# Patient Record
Sex: FEMALE | Race: BLACK OR AFRICAN AMERICAN | NOT HISPANIC OR LATINO | Employment: FULL TIME | URBAN - METROPOLITAN AREA
[De-identification: names, ages, dates, MRNs, and addresses within clinical notes are randomized per-mention and may not be internally consistent; named-entity substitution may affect disease eponyms.]

---

## 2017-10-23 ENCOUNTER — TELEPHONE (OUTPATIENT)
Dept: INTERNAL MEDICINE | Facility: CLINIC | Age: 47
End: 2017-10-23

## 2017-10-23 RX ORDER — OSELTAMIVIR PHOSPHATE 75 MG/1
75 CAPSULE ORAL 2 TIMES DAILY
Qty: 10 CAPSULE | Refills: 0 | Status: SHIPPED | OUTPATIENT
Start: 2017-10-23 | End: 2017-10-28

## 2020-03-13 RX ORDER — AZITHROMYCIN 500 MG/1
500 TABLET, FILM COATED ORAL DAILY
Qty: 7 TABLET | Refills: 0 | Status: SHIPPED | OUTPATIENT
Start: 2020-03-13 | End: 2022-06-27

## 2020-08-03 ENCOUNTER — HOSPITAL ENCOUNTER (OUTPATIENT)
Dept: RADIOLOGY | Facility: HOSPITAL | Age: 50
Discharge: HOME | End: 2020-08-03
Attending: INTERNAL MEDICINE
Payer: COMMERCIAL

## 2020-08-03 ENCOUNTER — TELEPHONE (OUTPATIENT)
Dept: INTERNAL MEDICINE | Facility: CLINIC | Age: 50
End: 2020-08-03

## 2020-08-03 DIAGNOSIS — Z00.00 ROUTINE GENERAL MEDICAL EXAMINATION AT A HEALTH CARE FACILITY: Primary | ICD-10-CM

## 2020-08-03 DIAGNOSIS — R60.9 EDEMA, UNSPECIFIED TYPE: ICD-10-CM

## 2020-08-03 PROCEDURE — 93970 EXTREMITY STUDY: CPT | Mod: TC

## 2020-08-03 PROCEDURE — 93970 US LOWER EXTREMITY VEINS BILATERAL: ICD-10-PCS | Mod: 26,,, | Performed by: RADIOLOGY

## 2020-08-03 PROCEDURE — 93970 EXTREMITY STUDY: CPT | Mod: 26,,, | Performed by: RADIOLOGY

## 2020-08-03 NOTE — TELEPHONE ENCOUNTER
PLease notify Claudette  Needs venous ultrasound of legs - booked for 6:30 pm at the imaging center  Fasting labs for the morning.   I can work her in - - have her come for 7:30 am .

## 2020-08-03 NOTE — TELEPHONE ENCOUNTER
Pt states that she is out of town, she won't be back until tomorrow. She would like u/s scheduled for then.

## 2020-08-04 ENCOUNTER — HOSPITAL ENCOUNTER (OUTPATIENT)
Dept: RADIOLOGY | Facility: HOSPITAL | Age: 50
Discharge: HOME | End: 2020-08-04
Attending: INTERNAL MEDICINE
Payer: COMMERCIAL

## 2020-08-04 ENCOUNTER — OFFICE VISIT (OUTPATIENT)
Dept: INTERNAL MEDICINE | Facility: CLINIC | Age: 50
End: 2020-08-04
Payer: COMMERCIAL

## 2020-08-04 VITALS
BODY MASS INDEX: 39.63 KG/M2 | SYSTOLIC BLOOD PRESSURE: 130 MMHG | HEIGHT: 61 IN | HEART RATE: 83 BPM | WEIGHT: 209.88 LBS | DIASTOLIC BLOOD PRESSURE: 80 MMHG | OXYGEN SATURATION: 99 %

## 2020-08-04 DIAGNOSIS — Z01.419 ENCOUNTER FOR GYNECOLOGICAL EXAMINATION WITHOUT ABNORMAL FINDING: ICD-10-CM

## 2020-08-04 DIAGNOSIS — Z12.31 SCREENING MAMMOGRAM, ENCOUNTER FOR: ICD-10-CM

## 2020-08-04 DIAGNOSIS — Z00.00 ROUTINE GENERAL MEDICAL EXAMINATION AT A HEALTH CARE FACILITY: Primary | ICD-10-CM

## 2020-08-04 DIAGNOSIS — Z12.11 SCREENING FOR COLON CANCER: ICD-10-CM

## 2020-08-04 PROCEDURE — 77063 MAMMO DIGITAL SCREENING BILAT WITH TOMOSYNTHESIS_CAD: ICD-10-PCS | Mod: 26,,, | Performed by: RADIOLOGY

## 2020-08-04 PROCEDURE — 99386 PREV VISIT NEW AGE 40-64: CPT | Mod: S$GLB,,, | Performed by: INTERNAL MEDICINE

## 2020-08-04 PROCEDURE — 77067 SCR MAMMO BI INCL CAD: CPT | Mod: 26,,, | Performed by: RADIOLOGY

## 2020-08-04 PROCEDURE — 3008F BODY MASS INDEX DOCD: CPT | Mod: CPTII,S$GLB,, | Performed by: INTERNAL MEDICINE

## 2020-08-04 PROCEDURE — 3008F PR BODY MASS INDEX (BMI) DOCUMENTED: ICD-10-PCS | Mod: CPTII,S$GLB,, | Performed by: INTERNAL MEDICINE

## 2020-08-04 PROCEDURE — 99999 PR PBB SHADOW E&M-EST. PATIENT-LVL III: ICD-10-PCS | Mod: PBBFAC,,, | Performed by: INTERNAL MEDICINE

## 2020-08-04 PROCEDURE — 77063 BREAST TOMOSYNTHESIS BI: CPT | Mod: 26,,, | Performed by: RADIOLOGY

## 2020-08-04 PROCEDURE — 77067 MAMMO DIGITAL SCREENING BILAT WITH TOMOSYNTHESIS_CAD: ICD-10-PCS | Mod: 26,,, | Performed by: RADIOLOGY

## 2020-08-04 PROCEDURE — 99386 PR PREVENTIVE VISIT,NEW,40-64: ICD-10-PCS | Mod: S$GLB,,, | Performed by: INTERNAL MEDICINE

## 2020-08-04 PROCEDURE — 77067 SCR MAMMO BI INCL CAD: CPT | Mod: TC

## 2020-08-04 PROCEDURE — 99999 PR PBB SHADOW E&M-EST. PATIENT-LVL III: CPT | Mod: PBBFAC,,, | Performed by: INTERNAL MEDICINE

## 2020-08-04 RX ORDER — ACETAMINOPHEN 500 MG
1 TABLET ORAL DAILY
COMMUNITY

## 2020-08-04 RX ORDER — ALPRAZOLAM 0.25 MG/1
0.25 TABLET ORAL NIGHTLY PRN
Qty: 30 TABLET | Refills: 0 | Status: SHIPPED | OUTPATIENT
Start: 2020-08-04 | End: 2022-06-27

## 2020-08-04 NOTE — LETTER
August 4, 2020    Claudette Loving  [address] [facility] [address] [place] [id number]             [facility]  [address]  [place] [address]  Phone: [phone]  Fax: [phone] To Whom It May Concern:    Ms Loving is ill. Please excuse her from work until 8/28/20.  She will be seen on 8/26/20 to determine eligibility to work on 8/28/20     Sincerely,        Regla Miranda MD

## 2020-08-04 NOTE — PROGRESS NOTES
CHIEF COMPLAINT: Annual exam     HISTORY OF PRESENT ILLNESS: This is a 49 year-old woman who presents for her annual exam    She has been stressed. She does not have an appetite. She is very nervous.  She is not sleeping at night.  She feels nervous over COVID pandemic and bringing it home to her father.  She thinks she has lost 15 pounds not intentionally over the last month.  NO depression. She is stressed.  Her 2 jobs are very short staffed and she is working extra shifts. She has tried melatonin without succcess    She has has had swelling in the left leg for the last 2 month. She has been wearing compression stocking.  She has tightness in the left lower leg.     She takes zyrtec 10 mg 2 tablets daliy due to hives and swelling of the lips (never found allergic).  Zyrtec works. If she does not take zyrtec, she itches all over.     She takes OTC nexium which controls reflux.     REVIEW OF SYSTEMS: She denies fevers, chills, night sweats, visual change, hearing loss, sore throat, chest pain, shortness of breath, nausea, vomiting, constipation, diarrhea, dysuria, hematuria, polydipsia, polyuria, joint pain, muscle pain.   Occasional headache    She has reading glasses.      PAST MEDICAL HISTORY:   Carpal tunnel syndrome - controlled on vitamin B6     PAST SURGICAL HISTORY: BTL     SOCIAL HISTORY: She does not smoke, she does not drink. Single, no children. 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     FAMILY HISTORY: Mother  of heart issues at age 60. She had an MI in her early 50s. She had GI bleed, high cholesterol. Father is living at age 65 and he is healthy. Brother  and sister are healthy.     REVIEW OF SYSTEMS: She denies fevers, chills, night sweats. . She denies any visual change, hearing loss, chest pain, shortness of breath, nausea, vomiting, constipation, diarrhea, dysuria, hematuria, joint pain, muscle pain, polydipsia, polyuria, hot or cold intolerance, anxiety, depression,  "insomnia.      SCREENING: Mammogram 9/15. GYN 10/15     MEDICATIONS: None.     ALLERGIES: No known drug allergies.     PHYSICAL EXAMINATION:   /80   Pulse 83   Ht 5' 1" (1.549 m)   Wt 95.2 kg (209 lb 14.1 oz)   SpO2 99%   BMI 39.66 kg/m²     GENERAL: She is alert, oriented, no apparent. Affect within normal limits.  HEENT: Conjunctivae anicteric. Pupils equal, round and reactive to light.  Tympanic membranes red fluid. Oropharynx clear.  NECK: Supple. No cervical lymphadenopathy, no thyroid enlargement.  Respiratory: Effort normal. Lungs are clear to auscultation.  HEART: Regular rate and rhythm without murmurs, gallops or rubs.  No lower extremity edema.  ABDOMEN: soft, non distended, non tender, bowel sounds present, no hepatosplenomgaly  BREAST: no abn seen, no nodules palpated, no axillary lad  Varicose veins of the lower extremity bilaterally    Labs yesterday reviewed - , uric acid 6.3    Venous ultrasound reviewed     ASSESSMENT AND PLAN:  Annual exam - discussed diet, exercise and safety issues.  Situational stress - xanax at bedtime. Work excuse through 8/28/20.  Will see back on 8/26/20  Hyperlipidemia - work on diet.  MMG  Colonoscopy  GYN exam on 8/27/20  Follow up on 8/26/20 for determination to return to work    "

## 2020-08-04 NOTE — LETTER
August 4, 2020    Claudette Loving  [address] [facility] [address] [place] [address]             [facility]  [address]  [place] [address]  Phone: [phone]  Fax: [phone] To Whom It May Concern:    Ms Loving is ill. Please excuse her from work until 8/27/20.  She will be seen on 8/26/20 to determine eligibility to work on 8/27/20     Sincerely,        Regla Miranda MD

## 2020-08-05 ENCOUNTER — TELEPHONE (OUTPATIENT)
Dept: RADIOLOGY | Facility: HOSPITAL | Age: 50
End: 2020-08-05

## 2020-08-05 NOTE — TELEPHONE ENCOUNTER
Spoke with patient and explained mammogram findings.Patient expressed understanding of results. Patient scheduled abnormal mammogram follow up appointment at [facility] on 8/14/2020.

## 2020-08-12 ENCOUNTER — TELEPHONE (OUTPATIENT)
Dept: INTERNAL MEDICINE | Facility: CLINIC | Age: 50
End: 2020-08-12

## 2020-08-12 NOTE — TELEPHONE ENCOUNTER
----- Message from Gina Peter sent at 8/12/2020 12:07 PM CDT -----  Contact: Merle with [place] [phone]  Merle states she received the incorrect [facility] paperwork for the pt. Merle states the paperwork can be faxed to . Please call and advise.

## 2020-08-14 ENCOUNTER — HOSPITAL ENCOUNTER (OUTPATIENT)
Dept: RADIOLOGY | Facility: HOSPITAL | Age: 50
Discharge: HOME | End: 2020-08-14
Attending: INTERNAL MEDICINE
Payer: COMMERCIAL

## 2020-08-14 DIAGNOSIS — R92.8 ABNORMAL MAMMOGRAM: ICD-10-CM

## 2020-08-14 PROCEDURE — 77061 BREAST TOMOSYNTHESIS UNI: CPT | Mod: TC

## 2020-08-14 PROCEDURE — 77061 BREAST TOMOSYNTHESIS UNI: CPT | Mod: 26,,, | Performed by: RADIOLOGY

## 2020-08-14 PROCEDURE — 77065 DX MAMMO INCL CAD UNI: CPT | Mod: 26,,, | Performed by: RADIOLOGY

## 2020-08-14 PROCEDURE — 77065 DX MAMMO INCL CAD UNI: CPT | Mod: TC

## 2020-08-14 PROCEDURE — 77065 MAMMO DIGITAL DIAGNOSTIC RIGHT WITH TOMOSYNTHESIS_CAD: ICD-10-PCS | Mod: 26,,, | Performed by: RADIOLOGY

## 2020-08-14 PROCEDURE — 77061 MAMMO DIGITAL DIAGNOSTIC RIGHT WITH TOMOSYNTHESIS_CAD: ICD-10-PCS | Mod: 26,,, | Performed by: RADIOLOGY

## 2020-08-17 ENCOUNTER — TELEPHONE (OUTPATIENT)
Dept: RADIOLOGY | Facility: HOSPITAL | Age: 50
End: 2020-08-17

## 2020-08-26 ENCOUNTER — OFFICE VISIT (OUTPATIENT)
Dept: INTERNAL MEDICINE | Facility: CLINIC | Age: 50
End: 2020-08-26
Payer: COMMERCIAL

## 2020-08-26 VITALS
WEIGHT: 215.19 LBS | HEIGHT: 60 IN | DIASTOLIC BLOOD PRESSURE: 80 MMHG | SYSTOLIC BLOOD PRESSURE: 110 MMHG | BODY MASS INDEX: 42.25 KG/M2 | OXYGEN SATURATION: 99 % | HEART RATE: 82 BPM

## 2020-08-26 DIAGNOSIS — R92.8 ABNORMAL MAMMOGRAM: ICD-10-CM

## 2020-08-26 DIAGNOSIS — E78.5 HYPERLIPIDEMIA, UNSPECIFIED HYPERLIPIDEMIA TYPE: ICD-10-CM

## 2020-08-26 DIAGNOSIS — F43.20 ADJUSTMENT DISORDER, UNSPECIFIED TYPE: Primary | ICD-10-CM

## 2020-08-26 PROCEDURE — 99214 PR OFFICE/OUTPT VISIT, EST, LEVL IV, 30-39 MIN: ICD-10-PCS | Mod: S$GLB,,, | Performed by: INTERNAL MEDICINE

## 2020-08-26 PROCEDURE — 99999 PR PBB SHADOW E&M-EST. PATIENT-LVL III: ICD-10-PCS | Mod: PBBFAC,,, | Performed by: INTERNAL MEDICINE

## 2020-08-26 PROCEDURE — 99999 PR PBB SHADOW E&M-EST. PATIENT-LVL III: CPT | Mod: PBBFAC,,, | Performed by: INTERNAL MEDICINE

## 2020-08-26 PROCEDURE — 3008F BODY MASS INDEX DOCD: CPT | Mod: CPTII,S$GLB,, | Performed by: INTERNAL MEDICINE

## 2020-08-26 PROCEDURE — 3008F PR BODY MASS INDEX (BMI) DOCUMENTED: ICD-10-PCS | Mod: CPTII,S$GLB,, | Performed by: INTERNAL MEDICINE

## 2020-08-26 PROCEDURE — 99214 OFFICE O/P EST MOD 30 MIN: CPT | Mod: S$GLB,,, | Performed by: INTERNAL MEDICINE

## 2020-08-26 NOTE — LETTER
August 26, 2020    Claudette Loving  [address] [facility] [address] [address]  [facility] [address]       Date of Birth 1970        [facility]  [address]  [facility] [address]  Phone: [phone]  Fax: [phone] To whom it may concern:    She may return to work on 9/2/20 at full duty.  Return to work is delayed 4 days due to a procedure on 8/27/20    Sincerely,        Regla Miranda MD

## 2020-08-26 NOTE — PROGRESS NOTES
CHIEF COMPLAINT: Follow up of stress     HISTORY OF PRESENT ILLNESS: This is a 49 year-old woman who presents for follow up of her stress.    She has not been working since her visit with me on 20.  She is sleeping well with xanax 0.25 mg at bedtime.  She feels rested. Appetite is better. She has gained 6 pounds b ack.  No headaches.  MOod is much better.      She had a mammogram and needs a right breast biopsy which is scheduled for tomorrow.      Swelling of the left leg is better since she has not been on her feet at work.     She takes zyrtec 10 mg 2 tablets daliy due to hives and swelling of the lips (never found allergic).  Zyrtec works. If she does not take zyrtec, she itches all over.      She takes OTC nexium which controls reflux.      REVIEW OF SYSTEMS: She denies fevers, chills, night sweats, visual change, hearing loss, sore throat, chest pain, shortness of breath, nausea, vomiting, constipation, diarrhea, dysuria, hematuria, polydipsia, polyuria, joint pain, muscle pain.   Occasional headache     She has reading glasses.      PAST MEDICAL HISTORY:   Carpal tunnel syndrome - controlled on vitamin B6     PAST SURGICAL HISTORY: BTL     SOCIAL HISTORY: She does not smoke, she does not drink. 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     FAMILY HISTORY: Mother  of heart issues at age 60. She had an MI in her early 50s. She had GI bleed, high cholesterol. Father is living at age 65 and he is healthy. Brother  and sister are healthy.     ALLERGIES: No known drug allergies.     PHYSICAL EXAMINATION:   /80   Pulse 82   Ht 5' (1.524 m)   Wt 97.6 kg (215 lb 2.7 oz)   SpO2 99%   BMI 42.02 kg/m²        GENERAL: She is alert, oriented, no apparent. Affect within normal limits.  HEENT: Conjunctivae anicteric.  NECK: Supple.  Respiratory: Effort normal. Lungs are clear to auscultation.  HEART: Regular rate and rhythm without murmurs, gallops or rubs.  No lower  extremity edema.     Labs  reviewed - , uric acid 6.3     Venous ultrasound reviewed     ASSESSMENT AND PLAN  Situational stress - better. Take xanax as needed now  Abnormal mammogram - to have breast biopsy tomorrow.  Hyperlipidemia - work on diet and wants to take supplements. Lipids in 6 months    Colonoscopy  GYN exam on 8/27/20  She is to return to [employer] [facility] on 9/2/20 at full duty (delayed 4 days due to breast biopsy).

## 2020-08-27 ENCOUNTER — OFFICE VISIT (OUTPATIENT)
Dept: OBSTETRICS AND GYNECOLOGY | Facility: CLINIC | Age: 50
End: 2020-08-27
Payer: COMMERCIAL

## 2020-08-27 ENCOUNTER — HOSPITAL ENCOUNTER (OUTPATIENT)
Dept: RADIOLOGY | Facility: HOSPITAL | Age: 50
Discharge: HOME | End: 2020-08-27
Attending: INTERNAL MEDICINE
Payer: COMMERCIAL

## 2020-08-27 VITALS
HEIGHT: 60 IN | WEIGHT: 215.19 LBS | BODY MASS INDEX: 42.25 KG/M2 | SYSTOLIC BLOOD PRESSURE: 120 MMHG | DIASTOLIC BLOOD PRESSURE: 72 MMHG

## 2020-08-27 DIAGNOSIS — R92.8 ABNORMAL MAMMOGRAM: ICD-10-CM

## 2020-08-27 DIAGNOSIS — Z01.419 ENCOUNTER FOR GYNECOLOGICAL EXAMINATION WITHOUT ABNORMAL FINDING: ICD-10-CM

## 2020-08-27 PROCEDURE — 19081 BX BREAST 1ST LESION STRTCTC: CPT | Mod: RT,,, | Performed by: RADIOLOGY

## 2020-08-27 PROCEDURE — 77065 MAMMO DIGITAL DIAGNOSTIC RIGHT WITH CAD: ICD-10-PCS | Mod: 26,,, | Performed by: RADIOLOGY

## 2020-08-27 PROCEDURE — 88305 TISSUE EXAM BY PATHOLOGIST: ICD-10-PCS | Mod: 26,,, | Performed by: PATHOLOGY

## 2020-08-27 PROCEDURE — 88175 CYTOPATH C/V AUTO FLUID REDO: CPT

## 2020-08-27 PROCEDURE — 3008F BODY MASS INDEX DOCD: CPT | Mod: CPTII,S$GLB,, | Performed by: OBSTETRICS & GYNECOLOGY

## 2020-08-27 PROCEDURE — 19081 MAMMO BREAST STEREOTACTIC BREAST BIOPSY RIGHT: ICD-10-PCS | Mod: RT,,, | Performed by: RADIOLOGY

## 2020-08-27 PROCEDURE — 99386 PR PREVENTIVE VISIT,NEW,40-64: ICD-10-PCS | Mod: S$GLB,,, | Performed by: OBSTETRICS & GYNECOLOGY

## 2020-08-27 PROCEDURE — 3008F PR BODY MASS INDEX (BMI) DOCUMENTED: ICD-10-PCS | Mod: CPTII,S$GLB,, | Performed by: OBSTETRICS & GYNECOLOGY

## 2020-08-27 PROCEDURE — 99999 PR PBB SHADOW E&M-EST. PATIENT-LVL IV: CPT | Mod: PBBFAC,,, | Performed by: OBSTETRICS & GYNECOLOGY

## 2020-08-27 PROCEDURE — 77065 DX MAMMO INCL CAD UNI: CPT | Mod: 26,,, | Performed by: RADIOLOGY

## 2020-08-27 PROCEDURE — 77065 DX MAMMO INCL CAD UNI: CPT | Mod: TC,RT

## 2020-08-27 PROCEDURE — 27200939 MAMMO DIGITAL DIAGNOSTIC RIGHT WITH CAD

## 2020-08-27 PROCEDURE — 25000003 PHARM REV CODE 250: Performed by: INTERNAL MEDICINE

## 2020-08-27 PROCEDURE — 99386 PREV VISIT NEW AGE 40-64: CPT | Mod: S$GLB,,, | Performed by: OBSTETRICS & GYNECOLOGY

## 2020-08-27 PROCEDURE — 19081 BX BREAST 1ST LESION STRTCTC: CPT | Mod: RT

## 2020-08-27 PROCEDURE — 99999 PR PBB SHADOW E&M-EST. PATIENT-LVL IV: ICD-10-PCS | Mod: PBBFAC,,, | Performed by: OBSTETRICS & GYNECOLOGY

## 2020-08-27 PROCEDURE — 88305 TISSUE EXAM BY PATHOLOGIST: CPT | Mod: 26,,, | Performed by: PATHOLOGY

## 2020-08-27 PROCEDURE — 88305 TISSUE EXAM BY PATHOLOGIST: CPT | Performed by: PATHOLOGY

## 2020-08-27 RX ORDER — LIDOCAINE HYDROCHLORIDE 20 MG/ML
2 INJECTION, SOLUTION INFILTRATION; PERINEURAL ONCE
Status: DISCONTINUED | OUTPATIENT
Start: 2020-08-27 | End: 2020-08-28 | Stop reason: HOSPADM

## 2020-08-27 RX ORDER — LIDOCAINE HYDROCHLORIDE AND EPINEPHRINE 20; 10 MG/ML; UG/ML
20 INJECTION, SOLUTION INFILTRATION; PERINEURAL ONCE
Status: COMPLETED | OUTPATIENT
Start: 2020-08-27 | End: 2020-08-27

## 2020-08-27 RX ORDER — LIDOCAINE HYDROCHLORIDE 10 MG/ML
2 INJECTION, SOLUTION EPIDURAL; INFILTRATION; INTRACAUDAL; PERINEURAL ONCE
Status: COMPLETED | OUTPATIENT
Start: 2020-08-27 | End: 2020-08-27

## 2020-08-27 RX ADMIN — LIDOCAINE HYDROCHLORIDE,EPINEPHRINE BITARTRATE 20 ML: 20; .01 INJECTION, SOLUTION INFILTRATION; PERINEURAL at 08:08

## 2020-08-27 RX ADMIN — LIDOCAINE HYDROCHLORIDE 2 ML: 10 INJECTION, SOLUTION EPIDURAL; INFILTRATION; INTRACAUDAL; PERINEURAL at 08:08

## 2020-08-27 NOTE — PROGRESS NOTES
CC: Well woman exam    Claudette Loving is a 49 y.o. female  presents for a well woman exam.  LMP: Patient's last menstrual period was 2020 (approximate)..  No issues, problems, or complaints.    History reviewed. No pertinent past medical history.  Past Surgical History:   Procedure Laterality Date    TUBAL LIGATION       Social History     Socioeconomic History    Marital status: Single     Spouse name: Not on file    Number of children: Not on file    Years of education: Not on file    Highest education level: Not on file   Occupational History    Not on file   Social Needs    Financial resource strain: Not on file    Food insecurity     Worry: Not on file     Inability: Not on file    Transportation needs     Medical: Not on file     Non-medical: Not on file   Tobacco Use    Smoking status: Never Smoker    Smokeless tobacco: Never Used   Substance and Sexual Activity    Alcohol use: No    Drug use: No    Sexual activity: Yes     Partners: Male     Birth control/protection: Surgical   Lifestyle    Physical activity     Days per week: Not on file     Minutes per session: Not on file    Stress: Not on file   Relationships    Social connections     Talks on phone: Not on file     Gets together: Not on file     Attends Orthodox service: Not on file     Active member of club or organization: Not on file     Attends meetings of clubs or organizations: Not on file     Relationship status: Not on file   Other Topics Concern    Not on file   Social History Narrative    Not on file     Family History   Problem Relation Age of Onset    Bone cancer Maternal Aunt     Bone cancer Maternal Aunt     Throat cancer Maternal Aunt     Prostate cancer Maternal Uncle     Ovarian cancer Neg Hx     Colon cancer Neg Hx     Breast cancer Neg Hx      OB History        0    Para   0    Term   0       0    AB   0    Living   0       SAB   0    TAB   0    Ectopic   0    Multiple   0    Live  Births   0                 /72   Ht 5' (1.524 m)   Wt 97.6 kg (215 lb 2.7 oz)   LMP 08/04/2020 (Approximate)   BMI 42.02 kg/m²       ROS:    ROS:  GENERAL: Denies weight gain or weight loss. Feeling well overall.   SKIN: Denies rash or lesions.   HEAD: Denies head injury or headache.   NODES: Denies enlarged lymph nodes.   CHEST: Denies chest pain or shortness of breath.   CARDIOVASCULAR: Denies palpitations or left sided chest pain.   ABDOMEN: No abdominal pain, constipation, diarrhea, nausea, vomiting or rectal bleeding.   URINARY: No frequency, dysuria, hematuria, or burning on urination.  REPRODUCTIVE: See HPI.   BREASTS: The patient performs breast self-examination and denies pain, lumps, or nipple discharge.   HEMATOLOGIC: No easy bruisability or excessive bleeding.   MUSCULOSKELETAL: Denies joint pain or swelling.   NEUROLOGIC: Denies syncope or weakness.   PSYCHIATRIC: Denies depression, anxiety or mood swings.    PHYSICAL EXAM:    APPEARANCE: Well nourished, well developed, in no acute distress.  AFFECT: WNL, alert and oriented x 3  SKIN: No acne or hirsutism  NECK: Neck symmetric without masses or thyromegaly  NODES: No inguinal, cervical, axillary, or femoral lymph node enlargement  CHEST: Good respiratory effect  ABDOMEN: Soft.  No tenderness or masses.  No hepatosplenomegaly.  No hernias.  BREASTS: Symmetrical, no skin changes or visible lesions.  No palpable masses, nipple discharge bilaterally.  PELVIC: Normal external genitalia without lesions.  Normal hair distribution.  Adequate perineal body, normal urethral meatus.  Vagina moist and well rugated without lesions or discharge.  Cervix pink, without lesions, discharge or tenderness.  No significant cystocele or rectocele.  Bimanual exam shows uterus to be normal size, regular, mobile and nontender.  Adnexa without masses or tenderness.    RECTAL: Rectovaginal exam confirms above with normal sphincter tone, no masses.  EXTREMITIES: No  edema.      ICD-10-CM ICD-9-CM    1. Encounter for gynecological examination without abnormal finding  Z01.419 V72.31 Ambulatory referral/consult to Gynecology      Liquid-Based Pap Smear, Screening   2.     BREAST Bx today - for abnormal MMG      Patient was counseled today on A.C.S. Pap guidelines and recommendations for yearly pelvic exams, mammograms and monthly self breast exams; to see her PCP for other health maintenance.     Follow up in about 1 year (around 8/27/2021).

## 2020-08-27 NOTE — LETTER
August 27, 2020      Regla Miranda MD  [address]  [facility] [address]           [facility]  [address] [place] [address]  [place] [address]  Phone: [phone]  Fax: [phone]          Patient: Claudette Loving   MR Number: [id number]   YOB: 1970   Date of Visit: 8/27/2020       Dear Dr. Regla Miranda:    Thank you for referring Claudette Loving to me for evaluation. Attached you will find relevant portions of my assessment and plan of care.    If you have questions, please do not hesitate to call me. I look forward to following Claudette Loving along with you.    Sincerely,    Catalina Marie MD    Enclosure  CC:  No Recipients    If you would like to receive this communication electronically, please contact [email or web address] or [phone] to request more information on [facility] access.    For providers and/or their staff who would like to refer a patient to [facility], please contact us through our one-stop-shop provider referral line, [facility], at [phone].    If you feel you have received this communication in error or would no longer like to receive these types of communications, please e-mail [email or web address]

## 2020-08-28 ENCOUNTER — TELEPHONE (OUTPATIENT)
Dept: SURGERY | Facility: CLINIC | Age: 50
End: 2020-08-28

## 2020-08-28 ENCOUNTER — TELEPHONE (OUTPATIENT)
Dept: INTERNAL MEDICINE | Facility: CLINIC | Age: 50
End: 2020-08-28

## 2020-08-28 LAB
FINAL PATHOLOGIC DIAGNOSIS: NORMAL
GROSS: NORMAL

## 2020-08-28 NOTE — TELEPHONE ENCOUNTER
Patient called with results of breast biopsy from 8/27/2020,   no atypia/benign, all questions answered, pt advised to follow up in 6 months with repeat imaging per core biopsy protocol.  reviewed biopsy results and results are benign and concordant. Patient verbalized understanding.

## 2020-08-28 NOTE — TELEPHONE ENCOUNTER
----- Message from Regla Miranda MD sent at 8/28/2020 12:32 PM CDT -----  Please notify pt that her breast biopsy is benign. No cancer  SF   Review of Systems:  General: Fatigue  Skin: Negative  Eyes: Double Vision  Ears: Negative  Nose: Bleeding  Mouth & Throat: Negative  Respiratory: Negative  Cardiovascular: Leg Swelling  Endocrine: Excessive Thirst  Gastrointestinal: Nausea, Heart Burn, Poor Appetite, Constipation and Abdominal Pain  Genitourinary: Negative  Musculoskeletal: Muscle/Joint Pain and Back Pain  Hematology/Oncology: Anemia  Psychiatric: Depression  Neurological: Migraine Headaches and Dizziness

## 2020-09-14 LAB
FINAL PATHOLOGIC DIAGNOSIS: NORMAL
Lab: NORMAL

## 2021-01-05 DIAGNOSIS — U07.1 COVID-19: Primary | ICD-10-CM

## 2021-01-06 ENCOUNTER — PATIENT MESSAGE (OUTPATIENT)
Dept: ADMINISTRATIVE | Facility: OTHER | Age: 51
End: 2021-01-06

## 2021-01-06 ENCOUNTER — PATIENT MESSAGE (OUTPATIENT)
Dept: ADMINISTRATIVE | Facility: CLINIC | Age: 51
End: 2021-01-06

## 2021-01-07 ENCOUNTER — PATIENT MESSAGE (OUTPATIENT)
Dept: ADMINISTRATIVE | Facility: OTHER | Age: 51
End: 2021-01-07

## 2021-01-08 ENCOUNTER — PATIENT MESSAGE (OUTPATIENT)
Dept: ADMINISTRATIVE | Facility: OTHER | Age: 51
End: 2021-01-08

## 2021-01-13 ENCOUNTER — PATIENT MESSAGE (OUTPATIENT)
Dept: ADMINISTRATIVE | Facility: OTHER | Age: 51
End: 2021-01-13

## 2021-01-14 ENCOUNTER — PATIENT MESSAGE (OUTPATIENT)
Dept: ADMINISTRATIVE | Facility: OTHER | Age: 51
End: 2021-01-14

## 2021-01-15 ENCOUNTER — PATIENT MESSAGE (OUTPATIENT)
Dept: ADMINISTRATIVE | Facility: OTHER | Age: 51
End: 2021-01-15

## 2021-01-16 ENCOUNTER — PATIENT MESSAGE (OUTPATIENT)
Dept: ADMINISTRATIVE | Facility: OTHER | Age: 51
End: 2021-01-16

## 2021-01-17 ENCOUNTER — PATIENT MESSAGE (OUTPATIENT)
Dept: ADMINISTRATIVE | Facility: OTHER | Age: 51
End: 2021-01-17

## 2021-01-18 ENCOUNTER — PATIENT MESSAGE (OUTPATIENT)
Dept: ADMINISTRATIVE | Facility: OTHER | Age: 51
End: 2021-01-18

## 2021-01-18 ENCOUNTER — NURSE TRIAGE (OUTPATIENT)
Dept: ADMINISTRATIVE | Facility: CLINIC | Age: 51
End: 2021-01-18

## 2021-01-19 ENCOUNTER — PATIENT MESSAGE (OUTPATIENT)
Dept: ADMINISTRATIVE | Facility: OTHER | Age: 51
End: 2021-01-19

## 2021-01-29 ENCOUNTER — PATIENT MESSAGE (OUTPATIENT)
Dept: ADMINISTRATIVE | Facility: HOSPITAL | Age: 51
End: 2021-01-29

## 2021-04-06 ENCOUNTER — TELEPHONE (OUTPATIENT)
Dept: INTERNAL MEDICINE | Facility: CLINIC | Age: 51
End: 2021-04-06

## 2021-04-06 ENCOUNTER — HOSPITAL ENCOUNTER (OUTPATIENT)
Dept: RADIOLOGY | Facility: HOSPITAL | Age: 51
Discharge: HOME | End: 2021-04-06
Attending: INTERNAL MEDICINE
Payer: COMMERCIAL

## 2021-04-06 DIAGNOSIS — R92.8 ABNORMAL MAMMOGRAM: Primary | ICD-10-CM

## 2021-04-06 DIAGNOSIS — R92.8 ABNORMAL MAMMOGRAM: ICD-10-CM

## 2021-04-06 PROCEDURE — 77065 MAMMO DIGITAL DIAGNOSTIC RIGHT WITH TOMOSYNTHESIS_CAD: ICD-10-PCS | Mod: 26,RT,, | Performed by: RADIOLOGY

## 2021-04-06 PROCEDURE — 77061 MAMMO DIGITAL DIAGNOSTIC RIGHT WITH TOMOSYNTHESIS_CAD: ICD-10-PCS | Mod: 26,RT,, | Performed by: RADIOLOGY

## 2021-04-06 PROCEDURE — 77061 BREAST TOMOSYNTHESIS UNI: CPT | Mod: TC,RT

## 2021-04-06 PROCEDURE — 77065 DX MAMMO INCL CAD UNI: CPT | Mod: 26,RT,, | Performed by: RADIOLOGY

## 2021-04-06 PROCEDURE — 77061 BREAST TOMOSYNTHESIS UNI: CPT | Mod: 26,RT,, | Performed by: RADIOLOGY

## 2021-04-14 ENCOUNTER — TELEPHONE (OUTPATIENT)
Dept: INTERNAL MEDICINE | Facility: CLINIC | Age: 51
End: 2021-04-14

## 2021-04-14 DIAGNOSIS — R92.8 ABNORMAL MAMMOGRAM: Primary | ICD-10-CM

## 2021-04-16 ENCOUNTER — PATIENT MESSAGE (OUTPATIENT)
Dept: RESEARCH | Facility: HOSPITAL | Age: 51
End: 2021-04-16

## 2021-06-05 ENCOUNTER — OFFICE VISIT (OUTPATIENT)
Dept: URGENT CARE | Facility: CLINIC | Age: 51
End: 2021-06-05
Payer: COMMERCIAL

## 2021-06-05 VITALS
WEIGHT: 215 LBS | RESPIRATION RATE: 16 BRPM | SYSTOLIC BLOOD PRESSURE: 139 MMHG | DIASTOLIC BLOOD PRESSURE: 80 MMHG | BODY MASS INDEX: 42.21 KG/M2 | TEMPERATURE: 99 F | HEART RATE: 84 BPM | HEIGHT: 60 IN | OXYGEN SATURATION: 98 %

## 2021-06-05 DIAGNOSIS — H10.13 ALLERGIC CONJUNCTIVITIS OF BOTH EYES: Primary | ICD-10-CM

## 2021-06-05 PROCEDURE — 3008F PR BODY MASS INDEX (BMI) DOCUMENTED: ICD-10-PCS | Mod: CPTII,S$GLB,, | Performed by: NURSE PRACTITIONER

## 2021-06-05 PROCEDURE — 3008F BODY MASS INDEX DOCD: CPT | Mod: CPTII,S$GLB,, | Performed by: NURSE PRACTITIONER

## 2021-06-05 PROCEDURE — 99214 OFFICE O/P EST MOD 30 MIN: CPT | Mod: S$GLB,,, | Performed by: NURSE PRACTITIONER

## 2021-06-05 PROCEDURE — 99214 PR OFFICE/OUTPT VISIT, EST, LEVL IV, 30-39 MIN: ICD-10-PCS | Mod: S$GLB,,, | Performed by: NURSE PRACTITIONER

## 2021-06-05 RX ORDER — OLOPATADINE HYDROCHLORIDE 2 MG/ML
1 SOLUTION/ DROPS OPHTHALMIC DAILY
Qty: 1 BOTTLE | Refills: 0 | Status: SHIPPED | OUTPATIENT
Start: 2021-06-05 | End: 2022-06-27

## 2021-10-04 ENCOUNTER — PATIENT MESSAGE (OUTPATIENT)
Dept: ADMINISTRATIVE | Facility: HOSPITAL | Age: 51
End: 2021-10-04

## 2021-10-27 ENCOUNTER — CLINICAL SUPPORT (OUTPATIENT)
Dept: URGENT CARE | Facility: CLINIC | Age: 51
End: 2021-10-27
Payer: COMMERCIAL

## 2021-10-27 DIAGNOSIS — Z20.822 ENCOUNTER FOR LABORATORY TESTING FOR COVID-19 VIRUS: ICD-10-CM

## 2021-10-27 PROCEDURE — U0003 INFECTIOUS AGENT DETECTION BY NUCLEIC ACID (DNA OR RNA); SEVERE ACUTE RESPIRATORY SYNDROME CORONAVIRUS 2 (SARS-COV-2) (CORONAVIRUS DISEASE [COVID-19]), AMPLIFIED PROBE TECHNIQUE, MAKING USE OF HIGH THROUGHPUT TECHNOLOGIES AS DESCRIBED BY CMS-2020-01-R: HCPCS | Performed by: NURSE PRACTITIONER

## 2021-10-27 PROCEDURE — U0005 INFEC AGEN DETEC AMPLI PROBE: HCPCS | Performed by: NURSE PRACTITIONER

## 2021-10-28 LAB
SARS-COV-2 RNA RESP QL NAA+PROBE: NOT DETECTED
SARS-COV-2- CYCLE NUMBER: NORMAL

## 2021-11-24 ENCOUNTER — HOSPITAL ENCOUNTER (OUTPATIENT)
Dept: RADIOLOGY | Facility: HOSPITAL | Age: 51
Discharge: HOME | End: 2021-11-24
Attending: INTERNAL MEDICINE
Payer: COMMERCIAL

## 2021-11-24 VITALS — WEIGHT: 215 LBS | BODY MASS INDEX: 40.59 KG/M2 | HEIGHT: 61 IN

## 2021-11-24 DIAGNOSIS — R92.8 ABNORMAL MAMMOGRAM: ICD-10-CM

## 2021-11-24 PROCEDURE — 77066 MAMMO DIGITAL DIAGNOSTIC BILAT WITH TOMO: ICD-10-PCS | Mod: 26,,, | Performed by: RADIOLOGY

## 2021-11-24 PROCEDURE — 77062 BREAST TOMOSYNTHESIS BI: CPT | Mod: 26,,, | Performed by: RADIOLOGY

## 2021-11-24 PROCEDURE — 77066 DX MAMMO INCL CAD BI: CPT | Mod: 26,,, | Performed by: RADIOLOGY

## 2021-11-24 PROCEDURE — 77066 DX MAMMO INCL CAD BI: CPT | Mod: TC

## 2021-11-24 PROCEDURE — 77062 MAMMO DIGITAL DIAGNOSTIC BILAT WITH TOMO: ICD-10-PCS | Mod: 26,,, | Performed by: RADIOLOGY

## 2022-03-16 ENCOUNTER — PATIENT MESSAGE (OUTPATIENT)
Dept: ADMINISTRATIVE | Facility: HOSPITAL | Age: 52
End: 2022-03-16
Payer: COMMERCIAL

## 2022-06-17 ENCOUNTER — CLINICAL SUPPORT (OUTPATIENT)
Dept: URGENT CARE | Facility: CLINIC | Age: 52
End: 2022-06-17
Payer: COMMERCIAL

## 2022-06-17 DIAGNOSIS — Z11.52 ENCOUNTER FOR SCREENING FOR COVID-19: Primary | ICD-10-CM

## 2022-06-17 LAB
CTP QC/QA: YES
SARS-COV-2 RDRP RESP QL NAA+PROBE: NEGATIVE

## 2022-06-17 PROCEDURE — U0002: ICD-10-PCS | Mod: QW,S$GLB,, | Performed by: NURSE PRACTITIONER

## 2022-06-17 PROCEDURE — U0002 COVID-19 LAB TEST NON-CDC: HCPCS | Mod: QW,S$GLB,, | Performed by: NURSE PRACTITIONER

## 2022-06-17 NOTE — PATIENT INSTRUCTIONS
Your test was NEGATIVE for COVID-19 (coronavirus).      You may leave home and/or return to work when the following conditions are met:  24 hours fever free without fever-reducing medications AND  Improved symptoms  You are fully vaccinated or have not had close contact with someone with COVID-19 (within 6 feet for 15 minutes or more)    If you are fully vaccinated and had a close contact, there is no need for quarantine, unless you develop symptoms.      If you are not fully vaccinated and had a close contact:  Retest at 5 to 7 days post-exposure  If possible, it is recommended that you quarantine for 5 days from the time of contact regardless of your test status.  A mask should be worn indoors post quarantine.    If your symptoms worsen or if you have any other concerns, please contact [facility] at [phone].

## 2022-06-27 ENCOUNTER — PATIENT MESSAGE (OUTPATIENT)
Dept: INTERNAL MEDICINE | Facility: CLINIC | Age: 52
End: 2022-06-27

## 2022-06-27 ENCOUNTER — OFFICE VISIT (OUTPATIENT)
Dept: INTERNAL MEDICINE | Facility: CLINIC | Age: 52
End: 2022-06-27
Payer: COMMERCIAL

## 2022-06-27 ENCOUNTER — LAB VISIT (OUTPATIENT)
Dept: LAB | Facility: HOSPITAL | Age: 52
End: 2022-06-27
Attending: INTERNAL MEDICINE
Payer: COMMERCIAL

## 2022-06-27 VITALS
HEIGHT: 61 IN | SYSTOLIC BLOOD PRESSURE: 128 MMHG | BODY MASS INDEX: 40.99 KG/M2 | OXYGEN SATURATION: 100 % | DIASTOLIC BLOOD PRESSURE: 76 MMHG | HEART RATE: 87 BPM | WEIGHT: 217.13 LBS

## 2022-06-27 DIAGNOSIS — Z12.31 SCREENING MAMMOGRAM FOR BREAST CANCER: ICD-10-CM

## 2022-06-27 DIAGNOSIS — Z00.00 ROUTINE GENERAL MEDICAL EXAMINATION AT A HEALTH CARE FACILITY: Primary | ICD-10-CM

## 2022-06-27 DIAGNOSIS — Z12.11 SCREENING FOR MALIGNANT NEOPLASM OF COLON: ICD-10-CM

## 2022-06-27 DIAGNOSIS — Z00.00 ROUTINE GENERAL MEDICAL EXAMINATION AT A HEALTH CARE FACILITY: ICD-10-CM

## 2022-06-27 LAB
25(OH)D3+25(OH)D2 SERPL-MCNC: 48 NG/ML (ref 30–96)
ALBUMIN SERPL BCP-MCNC: 4 G/DL (ref 3.5–5.2)
ALP SERPL-CCNC: 78 U/L (ref 55–135)
ALT SERPL W/O P-5'-P-CCNC: 21 U/L (ref 10–44)
ANION GAP SERPL CALC-SCNC: 13 MMOL/L (ref 8–16)
ANISOCYTOSIS BLD QL SMEAR: SLIGHT
AST SERPL-CCNC: 31 U/L (ref 10–40)
BASOPHILS # BLD AUTO: 0.05 K/UL (ref 0–0.2)
BASOPHILS NFR BLD: 0.5 % (ref 0–1.9)
BILIRUB SERPL-MCNC: 0.7 MG/DL (ref 0.1–1)
BUN SERPL-MCNC: 10 MG/DL (ref 6–20)
CALCIUM SERPL-MCNC: 10.2 MG/DL (ref 8.7–10.5)
CHLORIDE SERPL-SCNC: 103 MMOL/L (ref 95–110)
CHOLEST SERPL-MCNC: 261 MG/DL (ref 120–199)
CHOLEST/HDLC SERPL: 5 {RATIO} (ref 2–5)
CO2 SERPL-SCNC: 22 MMOL/L (ref 23–29)
CREAT SERPL-MCNC: 0.8 MG/DL (ref 0.5–1.4)
DIFFERENTIAL METHOD: ABNORMAL
EOSINOPHIL # BLD AUTO: 0.2 K/UL (ref 0–0.5)
EOSINOPHIL NFR BLD: 1.8 % (ref 0–8)
ERYTHROCYTE [DISTWIDTH] IN BLOOD BY AUTOMATED COUNT: 13.4 % (ref 11.5–14.5)
EST. GFR  (AFRICAN AMERICAN): >60 ML/MIN/1.73 M^2
EST. GFR  (NON AFRICAN AMERICAN): >60 ML/MIN/1.73 M^2
ESTIMATED AVG GLUCOSE: 97 MG/DL (ref 68–131)
GLUCOSE SERPL-MCNC: 85 MG/DL (ref 70–110)
HBA1C MFR BLD: 5 % (ref 4–5.6)
HCT VFR BLD AUTO: 38.9 % (ref 37–48.5)
HDLC SERPL-MCNC: 52 MG/DL (ref 40–75)
HDLC SERPL: 19.9 % (ref 20–50)
HGB BLD-MCNC: 13.7 G/DL (ref 12–16)
IMM GRANULOCYTES # BLD AUTO: 0.03 K/UL (ref 0–0.04)
IMM GRANULOCYTES NFR BLD AUTO: 0.3 % (ref 0–0.5)
LDLC SERPL CALC-MCNC: 195.8 MG/DL (ref 63–159)
LYMPHOCYTES # BLD AUTO: 2.1 K/UL (ref 1–4.8)
LYMPHOCYTES NFR BLD: 20.2 % (ref 18–48)
MCH RBC QN AUTO: 28.5 PG (ref 27–31)
MCHC RBC AUTO-ENTMCNC: 35.2 G/DL (ref 32–36)
MCV RBC AUTO: 81 FL (ref 82–98)
MONOCYTES # BLD AUTO: 1 K/UL (ref 0.3–1)
MONOCYTES NFR BLD: 9.3 % (ref 4–15)
NEUTROPHILS # BLD AUTO: 7.1 K/UL (ref 1.8–7.7)
NEUTROPHILS NFR BLD: 67.9 % (ref 38–73)
NONHDLC SERPL-MCNC: 209 MG/DL
NRBC BLD-RTO: 0 /100 WBC
PLATELET # BLD AUTO: ABNORMAL K/UL (ref 150–450)
PLATELET BLD QL SMEAR: ABNORMAL
PMV BLD AUTO: 11.2 FL (ref 9.2–12.9)
POTASSIUM SERPL-SCNC: 4.1 MMOL/L (ref 3.5–5.1)
PROT SERPL-MCNC: 7.8 G/DL (ref 6–8.4)
RBC # BLD AUTO: 4.8 M/UL (ref 4–5.4)
SODIUM SERPL-SCNC: 138 MMOL/L (ref 136–145)
TOXIC GRANULES BLD QL SMEAR: PRESENT
TRIGL SERPL-MCNC: 66 MG/DL (ref 30–150)
TSH SERPL DL<=0.005 MIU/L-ACNC: 3.56 UIU/ML (ref 0.4–4)
URATE SERPL-MCNC: 6.3 MG/DL (ref 2.4–5.7)
VIT B12 SERPL-MCNC: 677 PG/ML (ref 210–950)
WBC # BLD AUTO: 10.39 K/UL (ref 3.9–12.7)

## 2022-06-27 PROCEDURE — 84550 ASSAY OF BLOOD/URIC ACID: CPT | Performed by: INTERNAL MEDICINE

## 2022-06-27 PROCEDURE — 3008F PR BODY MASS INDEX (BMI) DOCUMENTED: ICD-10-PCS | Mod: CPTII,S$GLB,, | Performed by: INTERNAL MEDICINE

## 2022-06-27 PROCEDURE — 99396 PREV VISIT EST AGE 40-64: CPT | Mod: S$GLB,,, | Performed by: INTERNAL MEDICINE

## 2022-06-27 PROCEDURE — 3078F PR MOST RECENT DIASTOLIC BLOOD PRESSURE < 80 MM HG: ICD-10-PCS | Mod: CPTII,S$GLB,, | Performed by: INTERNAL MEDICINE

## 2022-06-27 PROCEDURE — 80061 LIPID PANEL: CPT | Performed by: INTERNAL MEDICINE

## 2022-06-27 PROCEDURE — 3008F BODY MASS INDEX DOCD: CPT | Mod: CPTII,S$GLB,, | Performed by: INTERNAL MEDICINE

## 2022-06-27 PROCEDURE — 1159F PR MEDICATION LIST DOCUMENTED IN MEDICAL RECORD: ICD-10-PCS | Mod: CPTII,S$GLB,, | Performed by: INTERNAL MEDICINE

## 2022-06-27 PROCEDURE — 83036 HEMOGLOBIN GLYCOSYLATED A1C: CPT | Performed by: INTERNAL MEDICINE

## 2022-06-27 PROCEDURE — 84443 ASSAY THYROID STIM HORMONE: CPT | Performed by: INTERNAL MEDICINE

## 2022-06-27 PROCEDURE — 3074F PR MOST RECENT SYSTOLIC BLOOD PRESSURE < 130 MM HG: ICD-10-PCS | Mod: CPTII,S$GLB,, | Performed by: INTERNAL MEDICINE

## 2022-06-27 PROCEDURE — 82607 VITAMIN B-12: CPT | Performed by: INTERNAL MEDICINE

## 2022-06-27 PROCEDURE — 1159F MED LIST DOCD IN RCRD: CPT | Mod: CPTII,S$GLB,, | Performed by: INTERNAL MEDICINE

## 2022-06-27 PROCEDURE — 85025 COMPLETE CBC W/AUTO DIFF WBC: CPT | Performed by: INTERNAL MEDICINE

## 2022-06-27 PROCEDURE — 80053 COMPREHEN METABOLIC PANEL: CPT | Performed by: INTERNAL MEDICINE

## 2022-06-27 PROCEDURE — 99999 PR PBB SHADOW E&M-EST. PATIENT-LVL III: ICD-10-PCS | Mod: PBBFAC,,, | Performed by: INTERNAL MEDICINE

## 2022-06-27 PROCEDURE — 99999 PR PBB SHADOW E&M-EST. PATIENT-LVL III: CPT | Mod: PBBFAC,,, | Performed by: INTERNAL MEDICINE

## 2022-06-27 PROCEDURE — 82306 VITAMIN D 25 HYDROXY: CPT | Performed by: INTERNAL MEDICINE

## 2022-06-27 PROCEDURE — 36415 COLL VENOUS BLD VENIPUNCTURE: CPT | Performed by: INTERNAL MEDICINE

## 2022-06-27 PROCEDURE — 3078F DIAST BP <80 MM HG: CPT | Mod: CPTII,S$GLB,, | Performed by: INTERNAL MEDICINE

## 2022-06-27 PROCEDURE — 3074F SYST BP LT 130 MM HG: CPT | Mod: CPTII,S$GLB,, | Performed by: INTERNAL MEDICINE

## 2022-06-27 PROCEDURE — 99396 PR PREVENTIVE VISIT,EST,40-64: ICD-10-PCS | Mod: S$GLB,,, | Performed by: INTERNAL MEDICINE

## 2022-06-27 NOTE — PROGRESS NOTES
"CHIEF COMPLAINT: Annual exam      HISTORY OF PRESENT ILLNESS: This is a 51 year-old woman who presents for her annual exam     She takes zyrtec 10 mg 2 tablets daliy due to hives and swelling of the lips (never found allergic).  Zyrtec works. If she does not take zyrtec, she itches all over.  No itching as long as she take zyrtec.     She takes OTC nexium which controls reflux.      REVIEW OF SYSTEMS: She denies fevers, chills, night sweats, visual change, hearing loss, sore throat, chest pain, shortness of breath, nausea, vomiting, constipation, diarrhea, dysuria, hematuria, polydipsia, polyuria, joint pain, muscle pain,  Headache. Sleep is ok. She does not sleep much. She works night shift.  Mood is ok     She has reading glasses. She needs to see an eye doctor     PAST MEDICAL HISTORY:   Carpal tunnel syndrome - controlled on vitamin B6     PAST SURGICAL HISTORY: BTL     SOCIAL HISTORY: She does not smoke, she does not drink. 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     FAMILY HISTORY: Mother  of heart issues at age 60. She had an MI in her early 50s. She had GI bleed, high cholesterol. Father is living at age 72 - diabeters.  Brother  and sister are healthy.     ALLERGIES: No known drug allergies.     PHYSICAL EXAMINATION:       /76 (BP Location: Right arm, Patient Position: Sitting)   Pulse 96   Ht 5' 1" (1.549 m)   Wt 98.5 kg (217 lb 2.5 oz)   SpO2 100%   BMI 41.03 kg/m²     GENERAL: She is alert, oriented, no apparent. Affect within normal limits.  HEENT: Conjunctivae anicteric.  NECK: Supple.  Respiratory: Effort normal. Lungs are clear to auscultation.  HEART: Regular rate and rhythm without murmurs, gallops or rubs.  No lower extremity edema.  ABDOMEN: soft, non distended, non tender, bowel sounds present, no hepatosplenomgaly  BREAST: no abn seen, no nodules palpated, no axillary lad        ASSESSMENT AND PLAN  Annual exam - discussed diet, exercise " and safety issues.    MMG 11/2021   Colonoscopy  GYN exam on 8/27/20  Follow up in one year  Answers for HPI/ROS submitted by the patient on 6/26/2022  activity change: No  unexpected weight change: No  neck pain: No  hearing loss: No  rhinorrhea: No  trouble swallowing: No  eye discharge: No  visual disturbance: No  chest tightness: No  wheezing: No  chest pain: No  palpitations: No  blood in stool: No  constipation: No  vomiting: No  diarrhea: No  polydipsia: No  polyuria: No  difficulty urinating: No  hematuria: No  menstrual problem: No  dysuria: No  joint swelling: No  arthralgias: No  headaches: No  weakness: No  confusion: No  dysphoric mood: No

## 2022-09-20 ENCOUNTER — OFFICE VISIT (OUTPATIENT)
Dept: OBSTETRICS AND GYNECOLOGY | Facility: CLINIC | Age: 52
End: 2022-09-20
Payer: COMMERCIAL

## 2022-09-20 VITALS
DIASTOLIC BLOOD PRESSURE: 82 MMHG | WEIGHT: 221.13 LBS | SYSTOLIC BLOOD PRESSURE: 124 MMHG | BODY MASS INDEX: 41.75 KG/M2 | HEIGHT: 61 IN

## 2022-09-20 DIAGNOSIS — Z01.419 ENCOUNTER FOR GYNECOLOGICAL EXAMINATION WITHOUT ABNORMAL FINDING: Primary | ICD-10-CM

## 2022-09-20 DIAGNOSIS — Z00.00 ROUTINE GENERAL MEDICAL EXAMINATION AT A HEALTH CARE FACILITY: ICD-10-CM

## 2022-09-20 DIAGNOSIS — N95.1 MENOPAUSAL SYMPTOMS: ICD-10-CM

## 2022-09-20 PROCEDURE — 3079F DIAST BP 80-89 MM HG: CPT | Mod: CPTII,S$GLB,, | Performed by: OBSTETRICS & GYNECOLOGY

## 2022-09-20 PROCEDURE — 99999 PR PBB SHADOW E&M-EST. PATIENT-LVL III: ICD-10-PCS | Mod: PBBFAC,,, | Performed by: OBSTETRICS & GYNECOLOGY

## 2022-09-20 PROCEDURE — 99396 PREV VISIT EST AGE 40-64: CPT | Mod: S$GLB,,, | Performed by: OBSTETRICS & GYNECOLOGY

## 2022-09-20 PROCEDURE — 99999 PR PBB SHADOW E&M-EST. PATIENT-LVL III: CPT | Mod: PBBFAC,,, | Performed by: OBSTETRICS & GYNECOLOGY

## 2022-09-20 PROCEDURE — 1159F PR MEDICATION LIST DOCUMENTED IN MEDICAL RECORD: ICD-10-PCS | Mod: CPTII,S$GLB,, | Performed by: OBSTETRICS & GYNECOLOGY

## 2022-09-20 PROCEDURE — 3008F PR BODY MASS INDEX (BMI) DOCUMENTED: ICD-10-PCS | Mod: CPTII,S$GLB,, | Performed by: OBSTETRICS & GYNECOLOGY

## 2022-09-20 PROCEDURE — 3008F BODY MASS INDEX DOCD: CPT | Mod: CPTII,S$GLB,, | Performed by: OBSTETRICS & GYNECOLOGY

## 2022-09-20 PROCEDURE — 3074F PR MOST RECENT SYSTOLIC BLOOD PRESSURE < 130 MM HG: ICD-10-PCS | Mod: CPTII,S$GLB,, | Performed by: OBSTETRICS & GYNECOLOGY

## 2022-09-20 PROCEDURE — 3044F PR MOST RECENT HEMOGLOBIN A1C LEVEL <7.0%: ICD-10-PCS | Mod: CPTII,S$GLB,, | Performed by: OBSTETRICS & GYNECOLOGY

## 2022-09-20 PROCEDURE — 1159F MED LIST DOCD IN RCRD: CPT | Mod: CPTII,S$GLB,, | Performed by: OBSTETRICS & GYNECOLOGY

## 2022-09-20 PROCEDURE — 3079F PR MOST RECENT DIASTOLIC BLOOD PRESSURE 80-89 MM HG: ICD-10-PCS | Mod: CPTII,S$GLB,, | Performed by: OBSTETRICS & GYNECOLOGY

## 2022-09-20 PROCEDURE — 3074F SYST BP LT 130 MM HG: CPT | Mod: CPTII,S$GLB,, | Performed by: OBSTETRICS & GYNECOLOGY

## 2022-09-20 PROCEDURE — 99396 PR PREVENTIVE VISIT,EST,40-64: ICD-10-PCS | Mod: S$GLB,,, | Performed by: OBSTETRICS & GYNECOLOGY

## 2022-09-20 PROCEDURE — 3044F HG A1C LEVEL LT 7.0%: CPT | Mod: CPTII,S$GLB,, | Performed by: OBSTETRICS & GYNECOLOGY

## 2022-09-20 RX ORDER — PHENTERMINE HYDROCHLORIDE 37.5 MG/1
37.5 TABLET ORAL DAILY
Status: ON HOLD | COMMUNITY
Start: 2022-08-09 | End: 2023-05-05 | Stop reason: HOSPADM

## 2022-09-20 NOTE — PROGRESS NOTES
"CC: Well woman exam    Claudette Loving is a 51 y.o. female  presents for a well woman exam.  LMP: Patient's last menstrual period was 2022..    Having hot flashes and some menopausal sx  She is still having cycles but they are not problematic at this time    Past Medical History:   Diagnosis Date    Usual hyperplasia of lactiferous duct 2020    fibrocystic changes including fibroadenomatoid change, usual ductal      Past Surgical History:   Procedure Laterality Date    BREAST BIOPSY Right 2020    fibrocystic changes including fibroadenomatoid change, usual ductal     TUBAL LIGATION       Social History     Socioeconomic History    Marital status: Single   Tobacco Use    Smoking status: Never    Smokeless tobacco: Never   Substance and Sexual Activity    Alcohol use: No    Drug use: No    Sexual activity: Yes     Partners: Male     Birth control/protection: Surgical     Family History   Problem Relation Age of Onset    Bone cancer Maternal Aunt     Bone cancer Maternal Aunt     Throat cancer Maternal Aunt     Prostate cancer Maternal Uncle     Ovarian cancer Neg Hx     Colon cancer Neg Hx     Breast cancer Neg Hx      OB History          0    Para   0    Term   0       0    AB   0    Living   0         SAB   0    IAB   0    Ectopic   0    Multiple   0    Live Births   0                 /82   Ht 5' 1" (1.549 m)   Wt 100.3 kg (221 lb 1.9 oz)   LMP 2022   BMI 41.78 kg/m²       ROS:    ROS:  GENERAL: Denies weight gain or weight loss. Feeling well overall.   SKIN: Denies rash or lesions.   HEAD: Denies head injury or headache.   NODES: Denies enlarged lymph nodes.   CHEST: Denies chest pain or shortness of breath.   CARDIOVASCULAR: Denies palpitations or left sided chest pain.   ABDOMEN: No abdominal pain, constipation, diarrhea, nausea, vomiting or rectal bleeding.   URINARY: No frequency, dysuria, hematuria, or burning on urination.  REPRODUCTIVE: See HPI.   BREASTS: " The patient performs breast self-examination and denies pain, lumps, or nipple discharge.   HEMATOLOGIC: No easy bruisability or excessive bleeding.   MUSCULOSKELETAL: Denies joint pain or swelling.   NEUROLOGIC: Denies syncope or weakness.   PSYCHIATRIC: Denies depression, anxiety or mood swings.    PHYSICAL EXAM:    APPEARANCE: Well nourished, well developed, in no acute distress.  AFFECT: WNL, alert and oriented x 3  SKIN: No acne or hirsutism  NECK: Neck symmetric without masses or thyromegaly  NODES: No inguinal, cervical, axillary, or femoral lymph node enlargement  CHEST: Good respiratory effect  ABDOMEN: Soft.  No tenderness or masses.  No hepatosplenomegaly.  No hernias.  BREASTS: Symmetrical, no skin changes or visible lesions.  No palpable masses, nipple discharge bilaterally.  PELVIC: Normal external genitalia without lesions.  Normal hair distribution.  Adequate perineal body, normal urethral meatus.  Vagina moist and well rugated without lesions or discharge.  Cervix pink, without lesions, discharge or tenderness.  No significant cystocele or rectocele.  Bimanual exam shows uterus to be normal size, regular, mobile and nontender.  Adnexa without masses or tenderness.    RECTAL: Rectovaginal exam confirms above with normal sphincter tone, no masses.  EXTREMITIES: No edema.      ICD-10-CM ICD-9-CM    1. Encounter for gynecological examination without abnormal finding  Z01.419 V72.31       2. Routine general medical examination at a health care facility  Z00.00 V70.0 Ambulatory referral/consult to Gynecology      3. Menopausal symptoms  N95.1 627.2         A full discussion of the benefit-risk ratio of hormonal replacement therapy was carried out. Improvement in vasomotor and other climacteric symptoms is discussed, including possible improvements in sleep and mood. Reduction of risk for osteoporosis was explained. We discussed the study data showing increased risk of thrombo-embolic events such as  myocardial infarction, stroke and also possibly breast cancer with estrogen replacement, and how this might affect her. The range of side effects such as breast tenderness, weight gain and including possible increases in lifetime risk of breast cancer and possible thrombotic complications was discussed. We also discussed ACOG's recommendation to use hormone replacement therapy for the relief of hot flashes alone and to be on the lowest dose possible for the shortest amount of time.  Alternative such as herbal and soy-based products were reviewed. All of her questions about this therapy were answered.    Black cohosh, soy products, estroven  Primerose oil  Vit E  Vit D  Omega 3 fatty acids  Tumeric   Chaste berry     Patient was counseled today on A.C.S. Pap guidelines and recommendations for yearly pelvic exams, mammograms and monthly self breast exams; to see her PCP for other health maintenance.     No follow-ups on file.

## 2022-11-28 ENCOUNTER — HOSPITAL ENCOUNTER (OUTPATIENT)
Dept: RADIOLOGY | Facility: HOSPITAL | Age: 52
Discharge: HOME | End: 2022-11-28
Attending: INTERNAL MEDICINE
Payer: COMMERCIAL

## 2022-11-28 DIAGNOSIS — Z12.31 SCREENING MAMMOGRAM FOR BREAST CANCER: ICD-10-CM

## 2022-11-28 PROCEDURE — 77067 SCR MAMMO BI INCL CAD: CPT | Mod: TC

## 2022-11-28 PROCEDURE — 77063 BREAST TOMOSYNTHESIS BI: CPT | Mod: 26,,, | Performed by: RADIOLOGY

## 2022-11-28 PROCEDURE — 77067 SCR MAMMO BI INCL CAD: CPT | Mod: 26,,, | Performed by: RADIOLOGY

## 2022-11-28 PROCEDURE — 77063 BREAST TOMOSYNTHESIS BI: CPT | Mod: TC

## 2022-11-28 PROCEDURE — 77067 MAMMO DIGITAL SCREENING BILAT WITH TOMO: ICD-10-PCS | Mod: 26,,, | Performed by: RADIOLOGY

## 2022-11-28 PROCEDURE — 77063 MAMMO DIGITAL SCREENING BILAT WITH TOMO: ICD-10-PCS | Mod: 26,,, | Performed by: RADIOLOGY

## 2023-04-04 ENCOUNTER — HOSPITAL ENCOUNTER (EMERGENCY)
Facility: HOSPITAL | Age: 53
Discharge: HOME | End: 2023-04-04
Attending: EMERGENCY MEDICINE
Payer: COMMERCIAL

## 2023-04-04 VITALS
TEMPERATURE: 99 F | HEART RATE: 84 BPM | SYSTOLIC BLOOD PRESSURE: 123 MMHG | OXYGEN SATURATION: 98 % | RESPIRATION RATE: 18 BRPM | DIASTOLIC BLOOD PRESSURE: 73 MMHG

## 2023-04-04 DIAGNOSIS — E86.0 DEHYDRATION: ICD-10-CM

## 2023-04-04 DIAGNOSIS — R19.7 DIARRHEA, UNSPECIFIED TYPE: Primary | ICD-10-CM

## 2023-04-04 LAB
ALBUMIN SERPL BCP-MCNC: 3.5 G/DL (ref 3.5–5.2)
ALP SERPL-CCNC: 74 U/L (ref 55–135)
ALT SERPL W/O P-5'-P-CCNC: 12 U/L (ref 10–44)
ANION GAP SERPL CALC-SCNC: 12 MMOL/L (ref 8–16)
AST SERPL-CCNC: 23 U/L (ref 10–40)
BASOPHILS # BLD AUTO: 0.04 K/UL (ref 0–0.2)
BASOPHILS NFR BLD: 0.3 % (ref 0–1.9)
BILIRUB SERPL-MCNC: 0.3 MG/DL (ref 0.1–1)
BILIRUB UR QL STRIP: NEGATIVE
BUN SERPL-MCNC: 11 MG/DL (ref 6–20)
CALCIUM SERPL-MCNC: 9.5 MG/DL (ref 8.7–10.5)
CHLORIDE SERPL-SCNC: 105 MMOL/L (ref 95–110)
CLARITY UR REFRACT.AUTO: CLEAR
CO2 SERPL-SCNC: 21 MMOL/L (ref 23–29)
COLOR UR AUTO: YELLOW
CREAT SERPL-MCNC: 1 MG/DL (ref 0.5–1.4)
DIFFERENTIAL METHOD: ABNORMAL
EOSINOPHIL # BLD AUTO: 0 K/UL (ref 0–0.5)
EOSINOPHIL NFR BLD: 0.1 % (ref 0–8)
ERYTHROCYTE [DISTWIDTH] IN BLOOD BY AUTOMATED COUNT: 14.7 % (ref 11.5–14.5)
EST. GFR  (NO RACE VARIABLE): >60 ML/MIN/1.73 M^2
GLUCOSE SERPL-MCNC: 94 MG/DL (ref 70–110)
GLUCOSE UR QL STRIP: NEGATIVE
HCT VFR BLD AUTO: 37.1 % (ref 37–48.5)
HCV AB SERPL QL IA: NORMAL
HGB BLD-MCNC: 13 G/DL (ref 12–16)
HGB UR QL STRIP: ABNORMAL
HIV 1+2 AB+HIV1 P24 AG SERPL QL IA: NORMAL
IMM GRANULOCYTES # BLD AUTO: 0.07 K/UL (ref 0–0.04)
IMM GRANULOCYTES NFR BLD AUTO: 0.6 % (ref 0–0.5)
KETONES UR QL STRIP: ABNORMAL
LEUKOCYTE ESTERASE UR QL STRIP: NEGATIVE
LYMPHOCYTES # BLD AUTO: 0.9 K/UL (ref 1–4.8)
LYMPHOCYTES NFR BLD: 7.2 % (ref 18–48)
MCH RBC QN AUTO: 28.1 PG (ref 27–31)
MCHC RBC AUTO-ENTMCNC: 35 G/DL (ref 32–36)
MCV RBC AUTO: 80 FL (ref 82–98)
MONOCYTES # BLD AUTO: 0.8 K/UL (ref 0.3–1)
MONOCYTES NFR BLD: 6.7 % (ref 4–15)
NEUTROPHILS # BLD AUTO: 10.4 K/UL (ref 1.8–7.7)
NEUTROPHILS NFR BLD: 85.1 % (ref 38–73)
NITRITE UR QL STRIP: NEGATIVE
NRBC BLD-RTO: 0 /100 WBC
PH UR STRIP: 6 [PH] (ref 5–8)
PLATELET # BLD AUTO: 268 K/UL (ref 150–450)
PMV BLD AUTO: 9.7 FL (ref 9.2–12.9)
POTASSIUM SERPL-SCNC: 3.8 MMOL/L (ref 3.5–5.1)
PROT SERPL-MCNC: 7.2 G/DL (ref 6–8.4)
PROT UR QL STRIP: ABNORMAL
RBC # BLD AUTO: 4.63 M/UL (ref 4–5.4)
SODIUM SERPL-SCNC: 138 MMOL/L (ref 136–145)
SP GR UR STRIP: 1.02 (ref 1–1.03)
URN SPEC COLLECT METH UR: ABNORMAL
WBC # BLD AUTO: 12.16 K/UL (ref 3.9–12.7)

## 2023-04-04 PROCEDURE — 99284 EMERGENCY DEPT VISIT MOD MDM: CPT | Mod: ,,, | Performed by: EMERGENCY MEDICINE

## 2023-04-04 PROCEDURE — 96375 TX/PRO/DX INJ NEW DRUG ADDON: CPT

## 2023-04-04 PROCEDURE — 87389 HIV-1 AG W/HIV-1&-2 AB AG IA: CPT | Performed by: PHYSICIAN ASSISTANT

## 2023-04-04 PROCEDURE — 96374 THER/PROPH/DIAG INJ IV PUSH: CPT

## 2023-04-04 PROCEDURE — 99284 EMERGENCY DEPT VISIT MOD MDM: CPT | Mod: 25

## 2023-04-04 PROCEDURE — 25000003 PHARM REV CODE 250: Performed by: EMERGENCY MEDICINE

## 2023-04-04 PROCEDURE — 80053 COMPREHEN METABOLIC PANEL: CPT | Performed by: EMERGENCY MEDICINE

## 2023-04-04 PROCEDURE — 63600175 PHARM REV CODE 636 W HCPCS

## 2023-04-04 PROCEDURE — 96361 HYDRATE IV INFUSION ADD-ON: CPT

## 2023-04-04 PROCEDURE — 85025 COMPLETE CBC W/AUTO DIFF WBC: CPT | Performed by: EMERGENCY MEDICINE

## 2023-04-04 PROCEDURE — 99284 PR EMERGENCY DEPT VISIT,LEVEL IV: ICD-10-PCS | Mod: ,,, | Performed by: EMERGENCY MEDICINE

## 2023-04-04 PROCEDURE — 86803 HEPATITIS C AB TEST: CPT | Performed by: PHYSICIAN ASSISTANT

## 2023-04-04 PROCEDURE — 63600175 PHARM REV CODE 636 W HCPCS: Performed by: EMERGENCY MEDICINE

## 2023-04-04 PROCEDURE — 81003 URINALYSIS AUTO W/O SCOPE: CPT | Performed by: EMERGENCY MEDICINE

## 2023-04-04 RX ORDER — DICYCLOMINE HYDROCHLORIDE 20 MG/1
20 TABLET ORAL 3 TIMES DAILY
Qty: 15 TABLET | Refills: 0 | Status: SHIPPED | OUTPATIENT
Start: 2023-04-04 | End: 2023-04-09

## 2023-04-04 RX ORDER — DICYCLOMINE HYDROCHLORIDE 10 MG/1
20 CAPSULE ORAL
Status: COMPLETED | OUTPATIENT
Start: 2023-04-04 | End: 2023-04-04

## 2023-04-04 RX ORDER — KETOROLAC TROMETHAMINE 30 MG/ML
10 INJECTION, SOLUTION INTRAMUSCULAR; INTRAVENOUS
Status: COMPLETED | OUTPATIENT
Start: 2023-04-04 | End: 2023-04-04

## 2023-04-04 RX ORDER — MORPHINE SULFATE 4 MG/ML
4 INJECTION, SOLUTION INTRAMUSCULAR; INTRAVENOUS
Status: COMPLETED | OUTPATIENT
Start: 2023-04-04 | End: 2023-04-04

## 2023-04-04 RX ADMIN — DICYCLOMINE HYDROCHLORIDE 20 MG: 10 CAPSULE ORAL at 04:04

## 2023-04-04 RX ADMIN — KETOROLAC TROMETHAMINE 10 MG: 30 INJECTION, SOLUTION INTRAMUSCULAR; INTRAVENOUS at 04:04

## 2023-04-04 RX ADMIN — MORPHINE SULFATE 4 MG: 4 INJECTION INTRAVENOUS at 05:04

## 2023-04-04 RX ADMIN — SODIUM CHLORIDE 1000 ML: 9 INJECTION, SOLUTION INTRAVENOUS at 03:04

## 2023-04-04 NOTE — ED PROVIDER NOTES
Encounter Date: 4/4/2023       History     Chief Complaint   Patient presents with    Abdominal Pain     Intermittent sharp lower abdominal pain and diarrhea x2 days. Episodes last 5-10 seconds. Kierra blood in stool. Denies fever or chills     Patient is a 53 yo female with no significant past medical history who presents to the ED from home with 1 day of abdominal pain and diarrhea. She first noticed the abdominal pain in the early morning of 4/3 which she describes as sharp pain across the lower abdomen/pelvic region. The pain comes and goes every 15-20 minutes and lasts for ~10 seconds. When there is pain it is a 10/10 and when the pain subsides she experiences 0/10 pain. She also noted diarrhea that began around the same time as the pain which has been loose and watery without blood or mucous. She had 10 episodes of diarrhea before it subsided at 4pm and hasn't had a bowel movement since. She has had decreases appetite since the onset of her symptoms but has been able to tolerate PO. She denies any fever, chills, nausea, vomiting, chest pain, or shortness of breath.     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    fibrocystic changes including fibroadenomatoid change, usual ductal      Past Surgical History:   Procedure Laterality Date    BREAST BIOPSY Right 08/27/2020    fibrocystic changes including fibroadenomatoid change, usual ductal     TUBAL LIGATION       Family History   Problem Relation Age of Onset    Bone cancer Maternal Aunt     Bone cancer Maternal Aunt     Throat cancer Maternal Aunt     Prostate cancer Maternal Uncle     Ovarian cancer Neg Hx     Colon cancer Neg Hx     Breast cancer Neg Hx      Social History     Tobacco Use    Smoking status: Never    Smokeless tobacco: Never   Substance Use Topics    Alcohol use:  No    Drug use: No     Review of Systems    Physical Exam     Initial Vitals [04/04/23 0212]   BP Pulse Resp Temp SpO2   125/77 (!) 114 18 99 °F (37.2 °C) 98 %      MAP       --         Physical Exam    Nursing note and vitals reviewed.  Constitutional: She appears well-developed and well-nourished. She is not diaphoretic. No distress.   HENT:   Head: Normocephalic and atraumatic.   Right Ear: External ear normal.   Left Ear: External ear normal.   Eyes: Conjunctivae and EOM are normal. Right eye exhibits no discharge. Left eye exhibits no discharge.   Neck: Neck supple.   Normal range of motion.  Cardiovascular:  Regular rhythm.   Tachycardia present.         Pulmonary/Chest: Breath sounds normal. No respiratory distress. She has no wheezes. She has no rales.   Abdominal: Abdomen is soft. Bowel sounds are normal. She exhibits no distension and no mass. There is no abdominal tenderness. There is no rebound.   Musculoskeletal:         General: No tenderness or edema. Normal range of motion.      Cervical back: Normal range of motion and neck supple.     Neurological: She is alert and oriented to person, place, and time.   Skin: Skin is warm and dry. No erythema.   Psychiatric: She has a normal mood and affect. Her behavior is normal. Thought content normal.       ED Course   Procedures  Labs Reviewed   URINALYSIS, REFLEX TO URINE CULTURE - Abnormal; Notable for the following components:       Result Value    Protein, UA Trace (*)     Ketones, UA Trace (*)     Occult Blood UA Trace (*)     All other components within normal limits    Narrative:     Specimen Source->Urine   CBC W/ AUTO DIFFERENTIAL - Abnormal; Notable for the following components:    MCV 80 (*)     RDW 14.7 (*)     Immature Granulocytes 0.6 (*)     Gran # (ANC) 10.4 (*)     Immature Grans (Abs) 0.07 (*)     Lymph # 0.9 (*)     Gran % 85.1 (*)     Lymph % 7.2 (*)     All other components within normal limits   COMPREHENSIVE METABOLIC PANEL - Abnormal;  Notable for the following components:    CO2 21 (*)     All other components within normal limits   HEPATITIS C ANTIBODY    Narrative:     Release to patient->Immediate   HIV 1 / 2 ANTIBODY          Imaging Results    None          Medications   sodium chloride 0.9% bolus 1,000 mL 1,000 mL (0 mLs Intravenous Stopped 4/4/23 0429)   ketorolac injection 9.999 mg (9.999 mg Intravenous Given 4/4/23 0429)   dicyclomine capsule 20 mg (20 mg Oral Given 4/4/23 0429)   morphine injection 4 mg (4 mg Intravenous Given 4/4/23 0544)     Medical Decision Making:   History:   Old Medical Records: I decided to obtain old medical records.  Initial Assessment:   Patient arrived to the ED tachycardic to 114 but AF and otherwise HDS satting well on RA. She is sitting comfortably in bed with waxing and waning abdominal pain. She has a non-tender abdomen with active bowel sounds.   Differential Diagnosis:   Gastroenteritis, diverticulitis, food-borne illness  Clinical Tests:   Lab Tests: Ordered and Reviewed  ED Management:  Infectious workup initiated for diarrhea and abdominal pain. Patient given 1L IVF and pain was treated with toradol and bentyl. CBC and UA unremarkable. Mild NAGMA noted on CMP with bicarb of 21 but electrolytes are otherwise within normal limits. Upon reevaluation, patient still without improvement in pain. She was given 4mg of IV morphine for pain control and offered a PO challenge. She was able to discharge home as her pain was controlled and able to tolerate PO. She was discharged home with a prescription for bentyl and close PCP follow up. Strict return precautions were discussed with patient who verbalized understanding.            Attending Attestation:   Physician Attestation Statement for Resident:  As the supervising MD   Physician Attestation Statement: I have personally seen and examined this patient.   I agree with the above history.  -:   As the supervising MD I agree with the above PE.     As the  supervising MD I agree with the above treatment, course, plan, and disposition.    I have reviewed and agree with the residents interpretation of the following: lab data.  I have reviewed the following: old records at this facility.              ED Course as of 04/04/23 0608 Tue Apr 04, 2023   0339 WBC: 12.16 [MM]   0423 NITRITE UA: Negative [MM]   0423 Leukocytes, UA: Negative [MM]   0439 CO2(!): 21 [MM]      ED Course User Index  [MM] Chacha Lewis MD                   Clinical Impression:   Final diagnoses:  [R19.7] Diarrhea, unspecified type (Primary)  [E86.0] Dehydration        ED Disposition Condition    Discharge Stable          ED Prescriptions       Medication Sig Dispense Start Date End Date Auth. Provider    dicyclomine (BENTYL) 20 mg tablet Take 1 tablet (20 mg total) by mouth 3 (three) times daily. for 5 days 15 tablet 4/4/2023 4/9/2023 Arianne Patel MD          Follow-up Information       Follow up With Specialties Details Why Contact Info    Regla Miranda MD Internal Medicine Schedule an appointment as soon as possible for a visit   [address]  [place] [address]  [phone]               Chacha Lewis MD  Resident  04/04/23 0608       Arianne Patel MD  04/06/23 2795

## 2023-04-04 NOTE — ED NOTES
Patient identifiers verified and correct for Claudette Loving.    LOC: The patient is awake, alert and oriented x 4. Pt is speaking appropriately, no slurred speech.  APPEARANCE: Patient resting comfortably and in no acute distress. Pt is clean and well groomed. No JVD visible. Pt reports lower abdominal pain.   SKIN: Skin is warm dry and intact, and color is consistent with ethnicity.   MUSCULOSKELETAL: Full range of motion present in all extremities. Hand  equal and leg strength strong +2 bilaterally.  RESPIRATORY: Airway is open and patent. Respirations-unlabored, regular rate, equal bilaterally on inspiration and expiration. No accessory muscle use noted.   CARDIAC: Patient has regular heart rate and rhythm.  No peripheral edema noted, and patient has no c/o chest pain.  ABDOMEN: Soft and non-tender to palpation with no distention noted. Lower abdominal pain. Diarrhea x2 days   NEUROLOGIC: Eyes open spontaneously and facial expression symmetrical. Pt behavior appropriate to situation, and pt follows commands.   : No complaints of frequency, burning, urgency or blood in the urine.

## 2023-04-04 NOTE — Clinical Note
"Claudette"Ravin Loving was seen and treated in our emergency department on 4/4/2023.  She may return to work on 04/05/2023.       If you have any questions or concerns, please don't hesitate to call.      Arianne Patel MD"

## 2023-04-04 NOTE — DISCHARGE INSTRUCTIONS
Treatments you received were:   Medications   sodium chloride 0.9% bolus 1,000 mL 1,000 mL (1,000 mLs Intravenous New Bag 4/4/23 0325)   ketorolac injection 9.999 mg (has no administration in time range)   dicyclomine capsule 20 mg (has no administration in time range)       Home Care Instructions:  - Medications: Continue taking your home medications as prescribed    Follow-Up Plan:  - Follow-up with: Primary care doctor within 3 - 5  days  - Additional testing and/or evaluation will be directed by your primary doctor    Return to the Emergency Department for symptoms including but not limited to: worsening symptoms, severe back pain, shortness of breath or chest pain, vomiting with inability to hold down fluids, blood in vomit or poop, fevers greater than 100.4°F, passing out/fainting/unconsciousness, or other concerning symptoms.

## 2023-04-04 NOTE — ED TRIAGE NOTES
Claudette Loving, a 52 y.o. female presents to the ED w/ complaint of intermittent lower abdominal pain.    Triage note:  Chief Complaint   Patient presents with    Abdominal Pain     Intermittent sharp lower abdominal pain and diarrhea x2 days. Episodes last 5-10 seconds. Kierra blood in stool. 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

## 2023-04-05 ENCOUNTER — OFFICE VISIT (OUTPATIENT)
Dept: INTERNAL MEDICINE | Facility: CLINIC | Age: 53
End: 2023-04-05
Payer: COMMERCIAL

## 2023-04-05 ENCOUNTER — HOSPITAL ENCOUNTER (OUTPATIENT)
Dept: RADIOLOGY | Facility: OTHER | Age: 53
Discharge: HOME | End: 2023-04-05
Attending: INTERNAL MEDICINE
Payer: COMMERCIAL

## 2023-04-05 VITALS
BODY MASS INDEX: 41.91 KG/M2 | DIASTOLIC BLOOD PRESSURE: 62 MMHG | HEART RATE: 84 BPM | OXYGEN SATURATION: 98 % | HEIGHT: 61 IN | SYSTOLIC BLOOD PRESSURE: 110 MMHG | WEIGHT: 222 LBS

## 2023-04-05 DIAGNOSIS — R10.9 ABDOMINAL PAIN, UNSPECIFIED ABDOMINAL LOCATION: ICD-10-CM

## 2023-04-05 DIAGNOSIS — R10.9 ABDOMINAL PAIN, UNSPECIFIED ABDOMINAL LOCATION: Primary | ICD-10-CM

## 2023-04-05 PROCEDURE — 3074F SYST BP LT 130 MM HG: CPT | Mod: CPTII,S$GLB,, | Performed by: INTERNAL MEDICINE

## 2023-04-05 PROCEDURE — 3008F PR BODY MASS INDEX (BMI) DOCUMENTED: ICD-10-PCS | Mod: CPTII,S$GLB,, | Performed by: INTERNAL MEDICINE

## 2023-04-05 PROCEDURE — 3078F DIAST BP <80 MM HG: CPT | Mod: CPTII,S$GLB,, | Performed by: INTERNAL MEDICINE

## 2023-04-05 PROCEDURE — 3078F PR MOST RECENT DIASTOLIC BLOOD PRESSURE < 80 MM HG: ICD-10-PCS | Mod: CPTII,S$GLB,, | Performed by: INTERNAL MEDICINE

## 2023-04-05 PROCEDURE — 3008F BODY MASS INDEX DOCD: CPT | Mod: CPTII,S$GLB,, | Performed by: INTERNAL MEDICINE

## 2023-04-05 PROCEDURE — 74176 CT ABDOMEN PELVIS WITHOUT CONTRAST: ICD-10-PCS | Mod: 26,,, | Performed by: RADIOLOGY

## 2023-04-05 PROCEDURE — 3074F PR MOST RECENT SYSTOLIC BLOOD PRESSURE < 130 MM HG: ICD-10-PCS | Mod: CPTII,S$GLB,, | Performed by: INTERNAL MEDICINE

## 2023-04-05 PROCEDURE — 1159F PR MEDICATION LIST DOCUMENTED IN MEDICAL RECORD: ICD-10-PCS | Mod: CPTII,S$GLB,, | Performed by: INTERNAL MEDICINE

## 2023-04-05 PROCEDURE — 1159F MED LIST DOCD IN RCRD: CPT | Mod: CPTII,S$GLB,, | Performed by: INTERNAL MEDICINE

## 2023-04-05 PROCEDURE — 99999 PR PBB SHADOW E&M-EST. PATIENT-LVL III: ICD-10-PCS | Mod: PBBFAC,,, | Performed by: INTERNAL MEDICINE

## 2023-04-05 PROCEDURE — 99999 PR PBB SHADOW E&M-EST. PATIENT-LVL III: CPT | Mod: PBBFAC,,, | Performed by: INTERNAL MEDICINE

## 2023-04-05 PROCEDURE — 99214 OFFICE O/P EST MOD 30 MIN: CPT | Mod: 25,S$GLB,, | Performed by: INTERNAL MEDICINE

## 2023-04-05 PROCEDURE — 74176 CT ABD & PELVIS W/O CONTRAST: CPT | Mod: TC

## 2023-04-05 PROCEDURE — 96372 THER/PROPH/DIAG INJ SC/IM: CPT | Mod: S$GLB,,, | Performed by: INTERNAL MEDICINE

## 2023-04-05 PROCEDURE — 74176 CT ABD & PELVIS W/O CONTRAST: CPT | Mod: 26,,, | Performed by: RADIOLOGY

## 2023-04-05 PROCEDURE — 96372 PR INJECTION,THERAP/PROPH/DIAG2ST, IM OR SUBCUT: ICD-10-PCS | Mod: S$GLB,,, | Performed by: INTERNAL MEDICINE

## 2023-04-05 PROCEDURE — 99214 PR OFFICE/OUTPT VISIT, EST, LEVL IV, 30-39 MIN: ICD-10-PCS | Mod: 25,S$GLB,, | Performed by: INTERNAL MEDICINE

## 2023-04-05 RX ORDER — HYOSCYAMINE SULFATE 0.125 MG
125 TABLET ORAL EVERY 4 HOURS PRN
Qty: 30 TABLET | Refills: 3 | Status: SHIPPED | OUTPATIENT
Start: 2023-04-05

## 2023-04-05 RX ORDER — CEFTRIAXONE 1 G/1
1 INJECTION, POWDER, FOR SOLUTION INTRAMUSCULAR; INTRAVENOUS
Status: COMPLETED | OUTPATIENT
Start: 2023-04-05 | End: 2023-04-05

## 2023-04-05 RX ADMIN — CEFTRIAXONE 1 G: 1 INJECTION, POWDER, FOR SOLUTION INTRAMUSCULAR; INTRAVENOUS at 10:04

## 2023-04-05 NOTE — PROGRESS NOTES
"CHIEF COMPLAINT: Abdominal pain     HISTORY OF PRESENT ILLNESS: This is a 52 year-old woman who presents due to abdominal pain that started on 4/3/23 at 2 am.  She had abdominal cramping and diarrhea.   She had 8-10 BM that started as brown stool then turned watery. Diarrhea stopped by 4 pm but abdominal cramping and pain persisted.   Pain was episodic and sharp every 15-20 minutes lasting 5 seconds. She presented to the ED on late evening of 4/3/23. Given IV morphine and toradol and oral bentyl and 1 liter of normal saline. Labs were fine.  Pain resolved in the ED but returned in 1-2 hours later.   She has continued to have lower abdominal pain (right and left side) Sharp 10/10 lasting 5-10 seconds. Now occurring every 30  minutes. She has taken a total of 4 bentyl without relief    No fever or chills, nausea, vomiting, dysuria or hematuria.  She had a brown soft stool yesterday.     She has not been on antibiotics     She takes zyrtec 10 mg 2 tablets daily which controls itching, hives and lip swelling.      She takes OTC nexium as needed which controls reflux.     She is taking c knapp 2 capsules once daily supplement daily     PAST MEDICAL HISTORY:   Carpal tunnel syndrome - controlled on vitamin B6     PAST SURGICAL HISTORY: BTL     SOCIAL HISTORY: She does not smoke, she does not drink. Single, no children. She is an LPN. She works for the [employer].     FAMILY HISTORY: Mother  of heart issues at age 60. She had an MI in her early 50s. She had GI bleed, high cholesterol. Father is living at age 72 - diabeters.  Brother  and sister are healthy.     ALLERGIES: No known drug allergies.     PHYSICAL EXAMINATION:    /62 (BP Location: Right arm, Patient Position: Sitting, BP Method: Large (Manual))   Pulse 84   Ht 5' 1" (1.549 m)   Wt 100.7 kg (222 lb)   SpO2 98%   BMI 41.95 kg/m²         GENERAL: She is alert, oriented, no apparent. Affect within normal limits.  HEENT: Conjunctivae " anicteric.  NECK: Supple.  Respiratory: Effort normal. Lungs are clear to auscultation.  HEART: Regular rate and rhythm without murmurs, gallops or rubs.  No lower extremity edema.  ABDOMEN: soft, non distended, non tender, bowel sounds present, no hepatosplenomgaly     ED records from 4/4/23 and labs from 4/4/23 reviewed      ASSESSMENT AND PLAN    Acute abdominal pain -Rocephin 1 gram IM to cover diverticulitis.   Hyosamine 0.125mg every 4 hours as needed for abdominal cramping.  CBC, CMP, amylase, lipase, CRP and ESR.  CT scan abdomen and pelivs without contrast.      MMG 11/2022   GYN exam on 8/27/20

## 2023-04-05 NOTE — PROGRESS NOTES
Two patient identifiers used. The patient tolerated the injection well. After the injection, the patient was advised to remain in the clinic for 15 minutes.

## 2023-04-06 ENCOUNTER — PATIENT MESSAGE (OUTPATIENT)
Dept: INTERNAL MEDICINE | Facility: CLINIC | Age: 53
End: 2023-04-06
Payer: COMMERCIAL

## 2023-04-06 DIAGNOSIS — K52.9 COLITIS: Primary | ICD-10-CM

## 2023-04-06 RX ORDER — CIPROFLOXACIN 500 MG/1
500 TABLET ORAL 2 TIMES DAILY
Qty: 20 TABLET | Refills: 0 | Status: SHIPPED | OUTPATIENT
Start: 2023-04-06 | End: 2023-04-16

## 2023-04-06 RX ORDER — METRONIDAZOLE 500 MG/1
500 TABLET ORAL 2 TIMES DAILY WITH MEALS
Qty: 20 TABLET | Refills: 0 | Status: SHIPPED | OUTPATIENT
Start: 2023-04-06 | End: 2023-04-16

## 2023-04-14 ENCOUNTER — PATIENT MESSAGE (OUTPATIENT)
Dept: ENDOSCOPY | Facility: HOSPITAL | Age: 53
End: 2023-04-14
Payer: COMMERCIAL

## 2023-04-14 DIAGNOSIS — K52.9 COLITIS: Primary | ICD-10-CM

## 2023-04-14 RX ORDER — POLYETHYLENE GLYCOL 3350, SODIUM SULFATE ANHYDROUS, SODIUM BICARBONATE, SODIUM CHLORIDE, POTASSIUM CHLORIDE 236; 22.74; 6.74; 5.86; 2.97 G/4L; G/4L; G/4L; G/4L; G/4L
4 POWDER, FOR SOLUTION ORAL ONCE
Qty: 4000 ML | Refills: 0 | Status: SHIPPED | OUTPATIENT
Start: 2023-04-14 | End: 2023-04-14

## 2023-05-05 ENCOUNTER — ANESTHESIA EVENT (OUTPATIENT)
Dept: ENDOSCOPY | Facility: HOSPITAL | Age: 53
End: 2023-05-05
Payer: COMMERCIAL

## 2023-05-05 ENCOUNTER — HOSPITAL ENCOUNTER (OUTPATIENT)
Facility: HOSPITAL | Age: 53
Discharge: HOME | End: 2023-05-05
Attending: INTERNAL MEDICINE | Admitting: INTERNAL MEDICINE
Payer: COMMERCIAL

## 2023-05-05 ENCOUNTER — ANESTHESIA (OUTPATIENT)
Dept: ENDOSCOPY | Facility: HOSPITAL | Age: 53
End: 2023-05-05
Payer: COMMERCIAL

## 2023-05-05 VITALS
HEIGHT: 61 IN | RESPIRATION RATE: 16 BRPM | OXYGEN SATURATION: 100 % | BODY MASS INDEX: 41.54 KG/M2 | SYSTOLIC BLOOD PRESSURE: 134 MMHG | WEIGHT: 220 LBS | DIASTOLIC BLOOD PRESSURE: 75 MMHG | HEART RATE: 68 BPM | TEMPERATURE: 98 F

## 2023-05-05 DIAGNOSIS — Z12.11 COLON CANCER SCREENING: Primary | ICD-10-CM

## 2023-05-05 DIAGNOSIS — Z12.11 SCREEN FOR COLON CANCER: ICD-10-CM

## 2023-05-05 LAB
B-HCG UR QL: NEGATIVE
CTP QC/QA: YES

## 2023-05-05 PROCEDURE — G0121 COLON CA SCRN NOT HI RSK IND: HCPCS | Performed by: INTERNAL MEDICINE

## 2023-05-05 PROCEDURE — 81025 URINE PREGNANCY TEST: CPT | Performed by: INTERNAL MEDICINE

## 2023-05-05 PROCEDURE — 25000003 PHARM REV CODE 250: Performed by: NURSE ANESTHETIST, CERTIFIED REGISTERED

## 2023-05-05 PROCEDURE — G0121 COLON CA SCRN NOT HI RSK IND: HCPCS | Mod: ,,, | Performed by: INTERNAL MEDICINE

## 2023-05-05 PROCEDURE — E9220 PRA ENDO ANESTHESIA: HCPCS | Mod: ,,, | Performed by: NURSE ANESTHETIST, CERTIFIED REGISTERED

## 2023-05-05 PROCEDURE — 37000009 HC ANESTHESIA EA ADD 15 MINS: Performed by: INTERNAL MEDICINE

## 2023-05-05 PROCEDURE — G0121 COLON CA SCRN NOT HI RSK IND: ICD-10-PCS | Mod: ,,, | Performed by: INTERNAL MEDICINE

## 2023-05-05 PROCEDURE — 63600175 PHARM REV CODE 636 W HCPCS: Performed by: NURSE ANESTHETIST, CERTIFIED REGISTERED

## 2023-05-05 PROCEDURE — 37000008 HC ANESTHESIA 1ST 15 MINUTES: Performed by: INTERNAL MEDICINE

## 2023-05-05 PROCEDURE — 25000003 PHARM REV CODE 250: Performed by: INTERNAL MEDICINE

## 2023-05-05 PROCEDURE — E9220 PRA ENDO ANESTHESIA: ICD-10-PCS | Mod: ,,, | Performed by: NURSE ANESTHETIST, CERTIFIED REGISTERED

## 2023-05-05 RX ORDER — LIDOCAINE HYDROCHLORIDE 20 MG/ML
INJECTION INTRAVENOUS
Status: DISCONTINUED | OUTPATIENT
Start: 2023-05-05 | End: 2023-05-05

## 2023-05-05 RX ORDER — PROPOFOL 10 MG/ML
VIAL (ML) INTRAVENOUS CONTINUOUS PRN
Status: DISCONTINUED | OUTPATIENT
Start: 2023-05-05 | End: 2023-05-05

## 2023-05-05 RX ORDER — PROPOFOL 10 MG/ML
VIAL (ML) INTRAVENOUS
Status: DISCONTINUED | OUTPATIENT
Start: 2023-05-05 | End: 2023-05-05

## 2023-05-05 RX ORDER — SODIUM CHLORIDE 9 MG/ML
INJECTION, SOLUTION INTRAVENOUS CONTINUOUS
Status: DISCONTINUED | OUTPATIENT
Start: 2023-05-05 | End: 2023-05-05 | Stop reason: HOSPADM

## 2023-05-05 RX ADMIN — PROPOFOL 80 MG: 10 INJECTION, EMULSION INTRAVENOUS at 08:05

## 2023-05-05 RX ADMIN — SODIUM CHLORIDE: 9 INJECTION, SOLUTION INTRAVENOUS at 07:05

## 2023-05-05 RX ADMIN — SODIUM CHLORIDE: 9 INJECTION, SOLUTION INTRAVENOUS at 08:05

## 2023-05-05 RX ADMIN — Medication 150 MCG/KG/MIN: at 08:05

## 2023-05-05 RX ADMIN — LIDOCAINE HYDROCHLORIDE 30 MG: 20 INJECTION INTRAVENOUS at 08:05

## 2023-05-05 NOTE — H&P
Short Stay Endoscopy History and Physical    PCP - Regla Miranda MD    Procedure - Colonoscopy  Sedation: GA  ASA - per anesthesia  Mallampati - per anesthesia  History of Anesthesia problems - no  Family history Anesthesia problems -  no     HPI:  This is a 52 y.o. female here for evaluation of : Screening for CRC, Recent colitis    Reflux - no  Dysphagia - no  Abdominal pain - no  Diarrhea - no    ROS:  Constitutional: No fevers, chills, No weight loss  ENT: No allergies  CV: No chest pain  Pulm: No cough, No shortness of breath  Ophtho: No vision changes  GI: see HPI  Medical History:  has a past medical history of Usual hyperplasia of lactiferous duct (08/27/2020).    Surgical History:  has a past surgical history that includes Tubal ligation and Breast biopsy (Right, 08/27/2020).    Family History: family history includes Bone cancer in her maternal aunt and maternal aunt; Prostate cancer in her maternal uncle; Throat cancer in her maternal aunt.. Otherwise no colon cancer, inflammatory bowel disease, or GI malignancies.    Social History:  reports that she has never smoked. She has never used smokeless tobacco. She reports that she does not drink alcohol and does not use drugs.    Review of patient's allergies indicates:   Allergen Reactions    Versed [midazolam]      Urinary retention after tubal ligation       Medications:   Medications Prior to Admission   Medication Sig Dispense Refill Last Dose    cetirizine (ZYRTEC) 10 MG tablet Take 20 mg by mouth once daily.       cholecalciferol, vitamin D3, (VITAMIN D3) 50 mcg (2,000 unit) Cap Take 1 capsule by mouth once daily.       esomeprazole magnesium (NEXIUM ORAL) Take by mouth. OTC one tablet daily       hyoscyamine (ANASPAZ,LEVSIN) 0.125 mg Tab Take 1 tablet (125 mcg total) by mouth every 4 (four) hours as needed (abdominal pain). 30 tablet 3     phentermine (ADIPEX-P) 37.5 mg tablet Take 37.5 mg by mouth once daily.       pyridoxine, vitamin B6, (B-6)  100 MG Tab Take 100 mg by mouth once daily.          Objective Findings:    Vital Signs: Per nursing notes.    Physical Exam:  General Appearance: Well appearing in no acute distress  Head:   Normocephalic, without obvious abnormality  Eyes:    No scleral icterus  Airway: Open  Neck: No restriction in mobility  Lungs: CTA bilaterally in anterior and posterior fields, no wheezes, no crackles.  Heart:  Regular rate and rhythm, S1, S2 normal, no murmurs heard  Abdomen: Soft, non tender, non distended      Labs:  Lab Results   Component Value Date    WBC 9.30 04/05/2023    HGB 12.6 04/05/2023    HCT 35.5 (L) 04/05/2023     04/05/2023    CHOL 261 (H) 06/27/2022    TRIG 66 06/27/2022    HDL 52 06/27/2022    ALT 15 04/05/2023    AST 23 04/05/2023     04/05/2023    K 3.7 04/05/2023     04/05/2023    CREATININE 0.8 04/05/2023    BUN 7 04/05/2023    CO2 22 (L) 04/05/2023    TSH 3.555 06/27/2022    GLUF 107 03/17/2006    HGBA1C 5.0 06/27/2022         I have explained the risks and benefits of endoscopy procedures to the patient including but not limited to bleeding, perforation, infection, and death.    Thank you so much for allowing me to participate in the care of Claudette Trivedi MD

## 2023-05-05 NOTE — PROVATION PATIENT INSTRUCTIONS
Discharge Summary/Instructions after an Endoscopic Procedure  Patient Name: Claudette Loving  Patient MRN: [id number]  Patient YOB: 1970  Friday, May 5, 2023  Sushil Trivedi MD  Dear patient,  As a result of recent federal legislation (The Federal Cures Act), you may   receive lab or pathology results from your procedure in your [facility]   account before your physician is able to contact you. Your physician or   their representative will relay the results to you with their   recommendations at their soonest availability.  Thank you,  RESTRICTIONS:  During your procedure today, you received medications for sedation.  These   medications may affect your judgment, balance and coordination.  Therefore,   for 24 hours, you have the following restrictions:   - DO NOT drive a car, operate machinery, make legal/financial decisions,   sign important papers or drink alcohol.    ACTIVITY:  Today: no heavy lifting, straining or running due to procedural   sedation/anesthesia.  The following day: return to full activity including work.  DIET:  Eat and drink normally unless instructed otherwise.     TREATMENT FOR COMMON SIDE EFFECTS:  - Mild abdominal pain, nausea, belching, bloating or excessive gas:  rest,   eat lightly and use a heating pad.  - Sore Throat: treat with throat lozenges and/or gargle with warm salt   water.  - Because air was used during the procedure, expelling large amounts of air   from your rectum or belching is normal.  - If a bowel prep was taken, you may not have a bowel movement for 1-3 days.    This is normal.  SYMPTOMS TO WATCH FOR AND REPORT TO YOUR PHYSICIAN:  1. Abdominal pain or bloating, other than gas cramps.  2. Chest pain.  3. Back pain.  4. Signs of infection such as: chills or fever occurring within 24 hours   after the procedure.  5. Rectal bleeding, which would show as bright red, maroon, or black stools.   (A tablespoon of blood from the rectum is not serious, especially if    hemorrhoids are present.)  6. Vomiting.  7. Weakness or dizziness.  GO DIRECTLY TO THE NEAREST EMERGENCY ROOM IF YOU HAVE ANY OF THE FOLLOWING:      Difficulty breathing              Chills and/or fever over 101 F   Persistent vomiting and/or vomiting blood   Severe abdominal pain   Severe chest pain   Black, tarry stools   Bleeding- more than one tablespoon   Any other symptom or condition that you feel may need urgent attention  Your doctor recommends these additional instructions:  If any biopsies were taken, your doctors clinic will contact you in 1 to 2   weeks with any results.  - Patient has a contact number available for emergencies.  The signs and   symptoms of potential delayed complications were discussed with the   patient.  Return to normal activities tomorrow.  Written discharge   instructions were provided to the patient.   - Discharge patient to home.   - Resume previous diet.   - Continue present medications.   - Repeat colonoscopy in 10 years for screening purposes.   For questions, problems or results please call your physician - Sushil Trivedi MD at Work:  [phone].  [facility], EMERGENCY ROOM PHONE NUMBER: [phone]  IF A COMPLICATION OR EMERGENCY SITUATION ARISES AND YOU ARE UNABLE TO REACH   YOUR PHYSICIAN - GO DIRECTLY TO THE EMERGENCY ROOM.  Sushil Trivedi MD  5/5/2023 8:57:48 AM  This report has been verified and signed electronically.  Dear patient,  As a result of recent federal legislation (The Federal Cures Act), you may   receive lab or pathology results from your procedure in your [facility]   account before your physician is able to contact you. Your physician or   their representative will relay the results to you with their   recommendations at their soonest availability.  Thank you,  PROVATION

## 2023-05-05 NOTE — ANESTHESIA POSTPROCEDURE EVALUATION
Anesthesia Post Evaluation    Patient: Claudette Loving    Procedure(s) Performed: Procedure(s) (LRB):  COLONOSCOPY (N/A)    Final Anesthesia Type: general      Patient location during evaluation: GI PACU  Patient participation: Yes- Able to Participate  Level of consciousness: awake and alert and awake  Post-procedure vital signs: reviewed and stable  Pain management: adequate  Airway patency: patent  IZABEL mitigation strategies: Postoperative administration of CPAP, nasopharyngeal airway, or oral appliance in the postanesthesia care unit (PACU)  PONV status at discharge: No PONV  Anesthetic complications: no      Cardiovascular status: blood pressure returned to baseline  Respiratory status: spontaneous ventilation, unassisted and room air  Hydration status: euvolemic  Follow-up not needed.          Vitals Value Taken Time   /75 05/05/23 0930   Temp 36.6 °C (97.9 °F) 05/05/23 0900   Pulse 68 05/05/23 0930   Resp 16 05/05/23 0930   SpO2 100 % 05/05/23 0930         Event Time   Out of Recovery 09:36:25         Pain/Dorothy Score: Doorthy Score: 10 (5/5/2023  9:10 AM)

## 2023-05-05 NOTE — ANESTHESIA PREPROCEDURE EVALUATION
[facility]  Anesthesia Pre-Operative Evaluation         Patient Name: Claudette Loving  YOB: 1970  MRN: [id number]    SUBJECTIVE:     05/05/2023    Pre-operative evaluation for Procedure(s) (LRB):  COLONOSCOPY (N/A)    Claudette Loving is a 52 y.o. female with history of obesity who now presents for the above procedure(s).      LDA:        Peripheral IV - Single Lumen 05/05/23 0747 22 G Posterior;Right Hand (Active)   Site Assessment Dry;Clean;Intact 05/05/23 0747   Line Status Blood return noted;Flushed 05/05/23 0747   Dressing Status Clean;Dry;Intact 05/05/23 0747   Number of days: 0       Previous airway:   None documented.      Drips:    sodium chloride 0.9% 10 mL/hr at 05/05/23 0734       There is no problem list on file for this patient.      Review of patient's allergies indicates:   Allergen Reactions    Versed [midazolam]      Urinary retention after tubal ligation       Current Inpatient Medications:      No current facility-administered medications on file prior to encounter.     Current Outpatient Medications on File Prior to Encounter   Medication Sig Dispense Refill    hyoscyamine (ANASPAZ,LEVSIN) 0.125 mg Tab Take 1 tablet (125 mcg total) by mouth every 4 (four) hours as needed (abdominal pain). 30 tablet 3    cetirizine (ZYRTEC) 10 MG tablet Take 20 mg by mouth once daily.      cholecalciferol, vitamin D3, (VITAMIN D3) 50 mcg (2,000 unit) Cap Take 1 capsule by mouth once daily.      esomeprazole magnesium (NEXIUM ORAL) Take by mouth. OTC one tablet daily      phentermine (ADIPEX-P) 37.5 mg tablet Take 37.5 mg by mouth once daily.      pyridoxine, vitamin B6, (B-6) 100 MG Tab Take 100 mg by mouth once daily.         Past Surgical History:   Procedure Laterality Date    BREAST BIOPSY Right 08/27/2020    fibrocystic changes including fibroadenomatoid change, usual ductal     TUBAL LIGATION         Social History     Socioeconomic History    Marital status: Single    Tobacco Use    Smoking status: Never    Smokeless tobacco: Never   Substance and Sexual Activity    Alcohol use: No    Drug use: No    Sexual activity: Yes     Partners: Male     Birth control/protection: Surgical       OBJECTIVE:     Vital Signs Range (Last 24H):  Temp:  [37 °C (98.6 °F)]   Pulse:  [80]   Resp:  [16]   BP: (137)/(68)   SpO2:  [98 %]       Significant Labs:  Lab Results   Component Value Date    WBC 9.30 04/05/2023    HGB 12.6 04/05/2023    HCT 35.5 (L) 04/05/2023     04/05/2023    CHOL 261 (H) 06/27/2022    TRIG 66 06/27/2022    HDL 52 06/27/2022    ALT 15 04/05/2023    AST 23 04/05/2023     04/05/2023    K 3.7 04/05/2023     04/05/2023    CREATININE 0.8 04/05/2023    BUN 7 04/05/2023    CO2 22 (L) 04/05/2023    TSH 3.555 06/27/2022    GLUF 107 03/17/2006    HGBA1C 5.0 06/27/2022         Diagnostic Studies:  No relevant studies.      Cardiac Studies:  EKG:   No recent studies available.    2D Echo:  No results found for this or any previous visit.      ASSESSMENT/PLAN:       Pre-op Assessment    I have reviewed the Patient Summary Reports.     I have reviewed the Nursing Notes.    I have reviewed the Medications.     Review of Systems  Anesthesia Hx:  Denies Family Hx of Anesthesia complications.   Denies Personal Hx of Anesthesia complications.   Social:  Non-Smoker    Hematology/Oncology:  Hematology Normal   Oncology Normal     EENT/Dental:EENT/Dental Normal   Cardiovascular:  Cardiovascular Normal Exercise tolerance: good     Pulmonary:  Pulmonary Normal    Renal/:  Renal/ Normal     Hepatic/GI:  Hepatic/GI Normal    Musculoskeletal:  Musculoskeletal Normal    Neurological:  Neurology Normal    Endocrine:  Endocrine Normal  Morbid Obesity / BMI > 40  Dermatological:  Skin Normal    Psych:  Psychiatric Normal           Physical Exam  General: Well nourished, Cooperative, Alert and Oriented    Airway:  Mallampati: II   Mouth Opening: Normal  TM Distance:  Normal  Tongue: Normal  Neck ROM: Normal ROM    Dental:  Intact    Heart:  Rate: Normal  Rhythm: Regular Rhythm        Anesthesia Plan  Type of Anesthesia, risks & benefits discussed:    Anesthesia Type: Gen Natural Airway, MAC  Intra-op Monitoring Plan: Standard ASA Monitors  Post Op Pain Control Plan: multimodal analgesia  Induction:  IV  ASA Score: 3  Day of Surgery Review of History & Physical: H&P Update referred to the surgeon/provider.    Ready For Surgery From Anesthesia Perspective.     .

## 2023-05-05 NOTE — TRANSFER OF CARE
"Anesthesia Transfer of Care Note    Patient: Claudette Loving    Procedure(s) Performed: Procedure(s) (LRB):  COLONOSCOPY (N/A)    Patient location: PACU    Anesthesia Type: general    Transport from OR: Transported from OR on room air with adequate spontaneous ventilation    Post pain: adequate analgesia    Post assessment: no apparent anesthetic complications and tolerated procedure well    Post vital signs: stable    Level of consciousness: awake, alert and oriented    Nausea/Vomiting: no nausea/vomiting    Complications: none    Transfer of care protocol was followed      Last vitals:   Visit Vitals  /57(BP Location: Left arm, Patient Position: Lying)   Pulse 81   Temp 97.8   Resp 16   Ht 5' 1" (1.549 m)   Wt 99.8 kg (220 lb)   SpO2 98%   Breastfeeding No   BMI 41.57 kg/m²     "

## 2024-01-11 ENCOUNTER — HOSPITAL ENCOUNTER (OUTPATIENT)
Dept: RADIOLOGY | Facility: HOSPITAL | Age: 54
Discharge: HOME | End: 2024-01-11
Attending: INTERNAL MEDICINE
Payer: COMMERCIAL

## 2024-01-11 VITALS — WEIGHT: 210 LBS | BODY MASS INDEX: 39.65 KG/M2 | HEIGHT: 61 IN

## 2024-01-11 DIAGNOSIS — Z12.31 ENCOUNTER FOR SCREENING MAMMOGRAM FOR BREAST CANCER: ICD-10-CM

## 2024-01-11 PROCEDURE — 77063 BREAST TOMOSYNTHESIS BI: CPT | Mod: 26,,, | Performed by: RADIOLOGY

## 2024-01-11 PROCEDURE — 77067 SCR MAMMO BI INCL CAD: CPT | Mod: 26,,, | Performed by: RADIOLOGY

## 2024-01-11 PROCEDURE — 77067 SCR MAMMO BI INCL CAD: CPT | Mod: TC

## 2024-01-22 ENCOUNTER — HOSPITAL ENCOUNTER (OUTPATIENT)
Dept: RADIOLOGY | Facility: HOSPITAL | Age: 54
Discharge: HOME | End: 2024-01-22
Attending: INTERNAL MEDICINE
Payer: COMMERCIAL

## 2024-01-22 ENCOUNTER — OFFICE VISIT (OUTPATIENT)
Dept: OBSTETRICS AND GYNECOLOGY | Facility: CLINIC | Age: 54
End: 2024-01-22
Payer: COMMERCIAL

## 2024-01-22 ENCOUNTER — TELEPHONE (OUTPATIENT)
Dept: RADIOLOGY | Facility: HOSPITAL | Age: 54
End: 2024-01-22
Payer: COMMERCIAL

## 2024-01-22 VITALS
DIASTOLIC BLOOD PRESSURE: 66 MMHG | SYSTOLIC BLOOD PRESSURE: 104 MMHG | BODY MASS INDEX: 40.12 KG/M2 | HEIGHT: 61 IN | WEIGHT: 212.5 LBS

## 2024-01-22 DIAGNOSIS — Z01.419 ENCOUNTER FOR GYNECOLOGICAL EXAMINATION WITHOUT ABNORMAL FINDING: Primary | ICD-10-CM

## 2024-01-22 DIAGNOSIS — R92.8 ABNORMAL FINDING ON BREAST IMAGING: ICD-10-CM

## 2024-01-22 PROCEDURE — 3008F BODY MASS INDEX DOCD: CPT | Mod: CPTII,S$GLB,, | Performed by: OBSTETRICS & GYNECOLOGY

## 2024-01-22 PROCEDURE — 1159F MED LIST DOCD IN RCRD: CPT | Mod: CPTII,S$GLB,, | Performed by: OBSTETRICS & GYNECOLOGY

## 2024-01-22 PROCEDURE — 77061 BREAST TOMOSYNTHESIS UNI: CPT | Mod: TC,LT

## 2024-01-22 PROCEDURE — 99999 PR PBB SHADOW E&M-EST. PATIENT-LVL III: CPT | Mod: PBBFAC,,, | Performed by: OBSTETRICS & GYNECOLOGY

## 2024-01-22 PROCEDURE — 88175 CYTOPATH C/V AUTO FLUID REDO: CPT | Performed by: OBSTETRICS & GYNECOLOGY

## 2024-01-22 PROCEDURE — 99396 PREV VISIT EST AGE 40-64: CPT | Mod: S$GLB,,, | Performed by: OBSTETRICS & GYNECOLOGY

## 2024-01-22 PROCEDURE — 1160F RVW MEDS BY RX/DR IN RCRD: CPT | Mod: CPTII,S$GLB,, | Performed by: OBSTETRICS & GYNECOLOGY

## 2024-01-22 PROCEDURE — 3078F DIAST BP <80 MM HG: CPT | Mod: CPTII,S$GLB,, | Performed by: OBSTETRICS & GYNECOLOGY

## 2024-01-22 PROCEDURE — 77065 DX MAMMO INCL CAD UNI: CPT | Mod: 26,LT,, | Performed by: RADIOLOGY

## 2024-01-22 PROCEDURE — 77065 DX MAMMO INCL CAD UNI: CPT | Mod: TC,LT

## 2024-01-22 PROCEDURE — 77061 BREAST TOMOSYNTHESIS UNI: CPT | Mod: 26,LT,, | Performed by: RADIOLOGY

## 2024-01-22 PROCEDURE — 3074F SYST BP LT 130 MM HG: CPT | Mod: CPTII,S$GLB,, | Performed by: OBSTETRICS & GYNECOLOGY

## 2024-01-22 NOTE — PROGRESS NOTES
"CC: Well woman exam    Claudette Loving is a 53 y.o. female  presents for a well woman exam.  LMP: Patient's last menstrual period was 2023 (exact date)..  No issues, problems, or complaints.    Normal cycles for the patient        Past Medical History:   Diagnosis Date    Usual hyperplasia of lactiferous duct 2020    fibrocystic changes including fibroadenomatoid change, usual ductal      Past Surgical History:   Procedure Laterality Date    BREAST BIOPSY Right 2020    fibrocystic changes including fibroadenomatoid change, usual ductal     COLONOSCOPY N/A 2023    Procedure: COLONOSCOPY;  Surgeon: Sushil Trivedi MD;  Location: [facility] ([address] [place] [address]);  Service: Endoscopy;  Laterality: N/A;  Colitis on ct scan 23. Needs colonoscopy early May  inst via portal   PreCall NA kls  pt confirmed earlier time  EB    TUBAL LIGATION       Social History     Socioeconomic History    Marital status: Single   Tobacco Use    Smoking status: Never    Smokeless tobacco: Never   Substance and Sexual Activity    Alcohol use: No    Drug use: No    Sexual activity: Yes     Partners: Male     Birth control/protection: Surgical     Family History   Problem Relation Age of Onset    Bone cancer Maternal Aunt     Bone cancer Maternal Aunt     Throat cancer Maternal Aunt     Prostate cancer Maternal Uncle     Uterine cancer Cousin     Ovarian cancer Cousin     Colon cancer Neg Hx     Breast cancer Neg Hx      OB History          0    Para   0    Term   0       0    AB   0    Living   0         SAB   0    IAB   0    Ectopic   0    Multiple   0    Live Births   0                 /66   Ht 5' 1" (1.549 m)   Wt 96.4 kg (212 lb 8.4 oz)   LMP 2023 (Exact Date)   BMI 40.16 kg/m²       ROS:    ROS:  GENERAL: Denies weight gain or weight loss. Feeling well overall.   SKIN: Denies rash or lesions.   HEAD: Denies head injury or headache.   NODES: Denies enlarged lymph nodes.   CHEST: Denies " chest pain or shortness of breath.   CARDIOVASCULAR: Denies palpitations or left sided chest pain.   ABDOMEN: No abdominal pain, constipation, diarrhea, nausea, vomiting or rectal bleeding.   URINARY: No frequency, dysuria, hematuria, or burning on urination.  REPRODUCTIVE: See HPI.   BREASTS: The patient performs breast self-examination and denies pain, lumps, or nipple discharge.   HEMATOLOGIC: No easy bruisability or excessive bleeding.   MUSCULOSKELETAL: Denies joint pain or swelling.   NEUROLOGIC: Denies syncope or weakness.   PSYCHIATRIC: Denies depression, anxiety or mood swings.    PHYSICAL EXAM:    APPEARANCE: Well nourished, well developed, in no acute distress.  AFFECT: WNL, alert and oriented x 3  SKIN: No acne or hirsutism  NECK: Neck symmetric without masses or thyromegaly  NODES: No inguinal, cervical, axillary, or femoral lymph node enlargement  CHEST: Good respiratory effect  ABDOMEN: Soft.  No tenderness or masses.  No hepatosplenomegaly.  No hernias.  BREASTS: Symmetrical, no skin changes or visible lesions.  No palpable masses, nipple discharge bilaterally.  PELVIC: Normal external genitalia without lesions.  Normal hair distribution.  Adequate perineal body, normal urethral meatus.  Vagina moist and well rugated without lesions or discharge.  Cervix pink, without lesions, discharge or tenderness.  No significant cystocele or rectocele.  Bimanual exam shows uterus to be normal size, regular, mobile and nontender.  Adnexa without masses or tenderness.    RECTAL: Rectovaginal exam confirms above with normal sphincter tone, no masses.  EXTREMITIES: No edema.    Diagnosis      ICD-10-CM ICD-9-CM    1. Encounter for gynecological examination without abnormal finding  Z01.419 V72.31 Liquid-Based Pap Smear, Screening            Patient was counseled today on A.C.S. Pap guidelines and recommendations for yearly pelvic exams, mammograms and monthly self breast exams; to see her PCP for other health  maintenance.     Follow up in about 1 year (around 1/22/2025).

## 2024-01-22 NOTE — TELEPHONE ENCOUNTER
Spoke with patient and explained mammogram findings.Patient expressed understanding of results. Patient scheduled abnormal mammogram follow up appointment at [facility] [place] on 1/22/2024.

## 2024-04-26 ENCOUNTER — OFFICE VISIT (OUTPATIENT)
Dept: INTERNAL MEDICINE | Facility: CLINIC | Age: 54
End: 2024-04-26
Payer: COMMERCIAL

## 2024-04-26 VITALS
DIASTOLIC BLOOD PRESSURE: 76 MMHG | WEIGHT: 205 LBS | HEART RATE: 89 BPM | BODY MASS INDEX: 38.74 KG/M2 | OXYGEN SATURATION: 99 % | SYSTOLIC BLOOD PRESSURE: 130 MMHG

## 2024-04-26 DIAGNOSIS — Z00.00 ROUTINE GENERAL MEDICAL EXAMINATION AT A HEALTH CARE FACILITY: Primary | ICD-10-CM

## 2024-04-26 PROCEDURE — 99999 PR PBB SHADOW E&M-EST. PATIENT-LVL III: CPT | Mod: PBBFAC,,, | Performed by: INTERNAL MEDICINE

## 2024-04-26 PROCEDURE — 1159F MED LIST DOCD IN RCRD: CPT | Mod: CPTII,S$GLB,, | Performed by: INTERNAL MEDICINE

## 2024-04-26 PROCEDURE — 3078F DIAST BP <80 MM HG: CPT | Mod: CPTII,S$GLB,, | Performed by: INTERNAL MEDICINE

## 2024-04-26 PROCEDURE — 3008F BODY MASS INDEX DOCD: CPT | Mod: CPTII,S$GLB,, | Performed by: INTERNAL MEDICINE

## 2024-04-26 PROCEDURE — 99396 PREV VISIT EST AGE 40-64: CPT | Mod: S$GLB,,, | Performed by: INTERNAL MEDICINE

## 2024-04-26 PROCEDURE — 3075F SYST BP GE 130 - 139MM HG: CPT | Mod: CPTII,S$GLB,, | Performed by: INTERNAL MEDICINE

## 2024-04-26 RX ORDER — ONDANSETRON 4 MG/1
4 TABLET, FILM COATED ORAL EVERY 8 HOURS PRN
Qty: 30 TABLET | Refills: 1 | Status: SHIPPED | OUTPATIENT
Start: 2024-04-26

## 2024-04-26 NOTE — PROGRESS NOTES
CHIEF COMPLAINT: Annual exam     HISTORY OF PRESENT ILLNESS: This is a 53 year-old woman who presents for annual exam    Since our last visit, she only had 2 more episodes of mild abdominal pain with diarrhea.  Resolved with bland diet.      She takes zyrtec 10 mg 2 tablets daily which controls itching, hives and lip swelling.      She takes OTC nexium as needed which controls reflux.       PAST MEDICAL HISTORY:   Carpal tunnel syndrome - controlled on vitamin B6     PAST SURGICAL HISTORY: BTL     SOCIAL HISTORY: She does not smoke, she does not drink. Single, no children. She is an LPN. She works for the [employer] - for 33 years.     FAMILY HISTORY: Mother  of heart issues at age 60. She had an MI in her early 50s. She had GI bleed, high cholesterol. Father is living at age 72 - diabeters.  Brother  and sister are healthy.     ALLERGIES: No known drug allergies.    REVIEW OF SYSTEMS: No fevers, chills, night sweats, fatigue, visual change, hearing loss, sinus congestion, sore throat, chest pain, shortness of breath, nausea, vomiting, constipation, diarrhea, dysuria, hematuria, polydipsia, polyuria, joint pain, muscle pain, headaches, anxiety, depression, insomnia.     Wears readers     PHYSICAL EXAMINATION:         GENERAL: She is alert, oriented, no apparent. Affect within normal limits.  HEENT: Conjunctivae anicteric.  NECK: Supple.  Respiratory: Effort normal. Lungs are clear to auscultation.  HEART: Regular rate and rhythm without murmurs, gallops or rubs.  No lower extremity edema.  ABDOMEN: soft, non distended, non tender, bowel sounds present, no hepatosplenomgaly       ASSESSMENT AND PLAN    Annual exam - discussed diet, exercise and safety issues.    Obesity - on terzepide 10 mg weekly through a spa clinic in [place] -lost 40 pounds        Colonoscopy 2023 - normal - 10 years.      MMG 2024   GYN exam 24 with normal pap  Answers submitted by the patient for this visit:  Review of Systems  Questionnaire (Submitted on 4/23/2024)  activity change: No  unexpected weight change: No  neck pain: No  hearing loss: No  rhinorrhea: No  trouble swallowing: No  eye discharge: No  visual disturbance: No  chest tightness: No  wheezing: No  chest pain: No  palpitations: No  blood in stool: No  constipation: No  vomiting: No  diarrhea: No  polydipsia: No  polyuria: No  difficulty urinating: No  hematuria: No  menstrual problem: No  dysuria: No  joint swelling: No  arthralgias: No  headaches: No  weakness: No  confusion: No  dysphoric mood: No

## 2024-04-29 ENCOUNTER — LAB VISIT (OUTPATIENT)
Dept: LAB | Facility: HOSPITAL | Age: 54
End: 2024-04-29
Attending: INTERNAL MEDICINE
Payer: COMMERCIAL

## 2024-04-29 DIAGNOSIS — Z00.00 ROUTINE GENERAL MEDICAL EXAMINATION AT A HEALTH CARE FACILITY: ICD-10-CM

## 2024-04-29 LAB
25(OH)D3+25(OH)D2 SERPL-MCNC: 46 NG/ML (ref 30–96)
ALBUMIN SERPL BCP-MCNC: 3.6 G/DL (ref 3.5–5.2)
ALP SERPL-CCNC: 72 U/L (ref 55–135)
ALT SERPL W/O P-5'-P-CCNC: 15 U/L (ref 10–44)
ANION GAP SERPL CALC-SCNC: 7 MMOL/L (ref 8–16)
AST SERPL-CCNC: 24 U/L (ref 10–40)
BASOPHILS # BLD AUTO: 0.03 K/UL (ref 0–0.2)
BASOPHILS NFR BLD: 0.3 % (ref 0–1.9)
BILIRUB SERPL-MCNC: 0.4 MG/DL (ref 0.1–1)
BUN SERPL-MCNC: 13 MG/DL (ref 6–20)
CALCIUM SERPL-MCNC: 9.6 MG/DL (ref 8.7–10.5)
CHLORIDE SERPL-SCNC: 108 MMOL/L (ref 95–110)
CHOLEST SERPL-MCNC: 254 MG/DL (ref 120–199)
CHOLEST/HDLC SERPL: 5.8 {RATIO} (ref 2–5)
CO2 SERPL-SCNC: 25 MMOL/L (ref 23–29)
CREAT SERPL-MCNC: 0.8 MG/DL (ref 0.5–1.4)
DIFFERENTIAL METHOD BLD: NORMAL
EOSINOPHIL # BLD AUTO: 0.2 K/UL (ref 0–0.5)
EOSINOPHIL NFR BLD: 1.9 % (ref 0–8)
ERYTHROCYTE [DISTWIDTH] IN BLOOD BY AUTOMATED COUNT: 13.7 % (ref 11.5–14.5)
EST. GFR  (NO RACE VARIABLE): >60 ML/MIN/1.73 M^2
ESTIMATED AVG GLUCOSE: 100 MG/DL (ref 68–131)
GLUCOSE SERPL-MCNC: 84 MG/DL (ref 70–110)
HBA1C MFR BLD: 5.1 % (ref 4–5.6)
HCT VFR BLD AUTO: 39 % (ref 37–48.5)
HDLC SERPL-MCNC: 44 MG/DL (ref 40–75)
HDLC SERPL: 17.3 % (ref 20–50)
HGB BLD-MCNC: 13.4 G/DL (ref 12–16)
IMM GRANULOCYTES # BLD AUTO: 0.03 K/UL (ref 0–0.04)
IMM GRANULOCYTES NFR BLD AUTO: 0.3 % (ref 0–0.5)
LDLC SERPL CALC-MCNC: 196.8 MG/DL (ref 63–159)
LYMPHOCYTES # BLD AUTO: 2.4 K/UL (ref 1–4.8)
LYMPHOCYTES NFR BLD: 26.3 % (ref 18–48)
MCH RBC QN AUTO: 28.6 PG (ref 27–31)
MCHC RBC AUTO-ENTMCNC: 34.4 G/DL (ref 32–36)
MCV RBC AUTO: 83 FL (ref 82–98)
MONOCYTES # BLD AUTO: 0.9 K/UL (ref 0.3–1)
MONOCYTES NFR BLD: 9.8 % (ref 4–15)
NEUTROPHILS # BLD AUTO: 5.6 K/UL (ref 1.8–7.7)
NEUTROPHILS NFR BLD: 61.4 % (ref 38–73)
NONHDLC SERPL-MCNC: 210 MG/DL
NRBC BLD-RTO: 0 /100 WBC
PLATELET # BLD AUTO: 308 K/UL (ref 150–450)
PMV BLD AUTO: 10 FL (ref 9.2–12.9)
POTASSIUM SERPL-SCNC: 3.7 MMOL/L (ref 3.5–5.1)
PROT SERPL-MCNC: 7.1 G/DL (ref 6–8.4)
RBC # BLD AUTO: 4.69 M/UL (ref 4–5.4)
SODIUM SERPL-SCNC: 140 MMOL/L (ref 136–145)
TRIGL SERPL-MCNC: 66 MG/DL (ref 30–150)
TSH SERPL DL<=0.005 MIU/L-ACNC: 2.86 UIU/ML (ref 0.4–4)
VIT B12 SERPL-MCNC: 436 PG/ML (ref 210–950)
WBC # BLD AUTO: 9.11 K/UL (ref 3.9–12.7)

## 2024-04-29 PROCEDURE — 82607 VITAMIN B-12: CPT | Performed by: INTERNAL MEDICINE

## 2024-04-29 PROCEDURE — 82306 VITAMIN D 25 HYDROXY: CPT | Performed by: INTERNAL MEDICINE

## 2024-04-29 PROCEDURE — 83036 HEMOGLOBIN GLYCOSYLATED A1C: CPT | Performed by: INTERNAL MEDICINE

## 2024-04-29 PROCEDURE — 80053 COMPREHEN METABOLIC PANEL: CPT | Performed by: INTERNAL MEDICINE

## 2024-04-29 PROCEDURE — 84443 ASSAY THYROID STIM HORMONE: CPT | Performed by: INTERNAL MEDICINE

## 2024-04-29 PROCEDURE — 36415 COLL VENOUS BLD VENIPUNCTURE: CPT | Performed by: INTERNAL MEDICINE

## 2024-04-29 PROCEDURE — 85025 COMPLETE CBC W/AUTO DIFF WBC: CPT | Performed by: INTERNAL MEDICINE

## 2024-04-29 PROCEDURE — 80061 LIPID PANEL: CPT | Performed by: INTERNAL MEDICINE

## 2024-06-10 ENCOUNTER — PATIENT MESSAGE (OUTPATIENT)
Dept: INTERNAL MEDICINE | Facility: CLINIC | Age: 54
End: 2024-06-10
Payer: COMMERCIAL

## 2025-01-13 ENCOUNTER — HOSPITAL ENCOUNTER (OUTPATIENT)
Dept: RADIOLOGY | Facility: HOSPITAL | Age: 55
Discharge: HOME | End: 2025-01-13
Attending: INTERNAL MEDICINE
Payer: COMMERCIAL

## 2025-01-13 DIAGNOSIS — Z12.31 ENCOUNTER FOR SCREENING MAMMOGRAM FOR BREAST CANCER: ICD-10-CM

## 2025-01-13 PROCEDURE — 77063 BREAST TOMOSYNTHESIS BI: CPT | Mod: TC

## 2025-01-13 PROCEDURE — 77067 SCR MAMMO BI INCL CAD: CPT | Mod: 26,,, | Performed by: RADIOLOGY

## 2025-01-13 PROCEDURE — 77063 BREAST TOMOSYNTHESIS BI: CPT | Mod: 26,,, | Performed by: RADIOLOGY

## 2025-03-25 ENCOUNTER — OFFICE VISIT (OUTPATIENT)
Dept: URGENT CARE | Facility: CLINIC | Age: 55
End: 2025-03-25
Payer: COMMERCIAL

## 2025-03-25 VITALS
TEMPERATURE: 98 F | RESPIRATION RATE: 18 BRPM | BODY MASS INDEX: 38.33 KG/M2 | OXYGEN SATURATION: 100 % | DIASTOLIC BLOOD PRESSURE: 65 MMHG | HEIGHT: 61 IN | SYSTOLIC BLOOD PRESSURE: 137 MMHG | WEIGHT: 203 LBS | HEART RATE: 70 BPM

## 2025-03-25 DIAGNOSIS — M79.644 THUMB PAIN, RIGHT: ICD-10-CM

## 2025-03-25 DIAGNOSIS — M65.311 TRIGGER FINGER OF RIGHT THUMB: Primary | ICD-10-CM

## 2025-03-25 PROCEDURE — 99203 OFFICE O/P NEW LOW 30 MIN: CPT | Mod: S$GLB,,, | Performed by: NURSE PRACTITIONER

## 2025-03-25 PROCEDURE — 73140 X-RAY EXAM OF FINGER(S): CPT | Mod: RT,S$GLB,, | Performed by: RADIOLOGY

## 2025-03-25 NOTE — PATIENT INSTRUCTIONS
Please drink plenty of fluids.  Please get plenty of rest.  Please return here or go to the Emergency Department for any concerns or worsening of condition.  If you were prescribed a narcotic medication, do not drive or operate heavy equipment or machinery while taking these medications.  If you were not prescribed an anti-inflammatory medication, and if you do not have any history of stomach/intestinal ulcers, or kidney disease, or are not taking a blood thinner such as Coumadin, Plavix, Pradaxa, Eloquis, or Xaralta for example, it is OK to take over the counter Ibuprofen or Advil or Motrin or Aleve as directed.  Do not take these medications on an empty stomach.  Rest, ice, compression and elevation to the affected joint or limb as needed.  Please follow up with your primary care doctor and hand specialist.  A referral was placed for you, call [phone] to schedule appointment.      If you  smoke, please stop smoking.

## 2025-03-25 NOTE — PROGRESS NOTES
"Subjective:      Patient ID: Claudette Loving is a 54 y.o. female.    Vitals:  height is 5' 1" (1.549 m) and weight is 92.1 kg (203 lb). Her oral temperature is 97.7 °F (36.5 °C). Her blood pressure is 137/65 and her pulse is 70. Her respiration is 18 and oxygen saturation is 100%.     Chief Complaint: Injury    Pt presents today w/ rt thumb injury ; incident occurred approx 4x days ago. Pt reports she suspects she injured her rt thumb while picking up a case of water. Pt c/o slight edema ,decrease in ROM and decrease in ability to bear full weight in rt hand. Pt denies foreign bodies, loss of sensation and trauma to any other extremity. Pt tried ibuprofen;mild relief.     Injury  This is a new problem. The current episode started in the past 7 days. The problem occurs constantly. The problem has been unchanged. Associated symptoms include joint swelling. Pertinent negatives include no abdominal pain, anorexia, arthralgias, change in bowel habit, chest pain, chills, congestion, coughing, diaphoresis, fatigue, fever, headaches, myalgias, nausea, neck pain, numbness, rash, sore throat, swollen glands, urinary symptoms, vertigo, visual change, vomiting or weakness. Exacerbated by: extertion. Treatments tried: ibuprofen. The treatment provided mild relief.       Constitution: Negative for chills, sweating, fatigue and fever.   HENT:  Negative for congestion and sore throat.    Neck: Negative for neck pain.   Cardiovascular:  Negative for chest pain.   Respiratory:  Negative for cough.    Gastrointestinal:  Negative for abdominal pain, nausea and vomiting.   Musculoskeletal:  Positive for joint swelling. Negative for joint pain and muscle ache.   Skin:  Negative for rash and erythema.   Neurological:  Negative for history of vertigo, headaches and numbness.      Objective:     Physical Exam   Constitutional: She is oriented to person, place, and time. She appears well-developed.   HENT:   Head: Normocephalic and atraumatic. " Head is without abrasion, without contusion and without laceration.   Ears:   Right Ear: External ear normal.   Left Ear: External ear normal.   Nose: Nose normal.   Mouth/Throat: Oropharynx is clear and moist and mucous membranes are normal.   Eyes: Conjunctivae, EOM and lids are normal. Pupils are equal, round, and reactive to light.   Neck: Trachea normal and phonation normal. Neck supple.   Cardiovascular: Normal rate, regular rhythm, normal heart sounds and normal pulses.   Pulmonary/Chest: Effort normal and breath sounds normal. No stridor. No respiratory distress.   Musculoskeletal: Normal range of motion.         General: Normal range of motion.      Right hand: She exhibits tenderness, bony tenderness and deformity. She exhibits normal range of motion, normal two-point discrimination, normal capillary refill, no laceration and no swelling. Normal sensation noted. Decreased sensation is not present in the ulnar distribution, is not present in the medial distribution and is not present in the radial distribution. Normal strength noted. Right thumb: Exhibits tenderness and deformity (PIP protruding on right thumb). There is tenderness of the MCP and IP joint. Motor /Testing: The patient has normal right wrist strength.   Neurological: She is alert and oriented to person, place, and time.   Skin: Skin is warm, dry, intact and no rash. Capillary refill takes less than 2 seconds. No abrasion, No burn, No bruising, No erythema and No ecchymosis   Psychiatric: Her speech is normal and behavior is normal. Judgment and thought content normal.   Nursing note and vitals reviewed.    X-Ray Finger 2 or More Views Right  Result Date: 3/25/2025  EXAMINATION: XR FINGER 2 OR MORE VIEWS RIGHT CLINICAL HISTORY: Pain in right finger(s) TECHNIQUE: XR FINGER 2 OR MORE VIEWS RIGHT COMPARISON: None FINDINGS: Mild degenerative changes of the 1st MCP joint.  The remainder of the joint spaces appear preserved.  In tiny linear  radiodensity adjacent to the base of the 3rd proximal phalanx ulnar aspect suggesting age indeterminate capsular/ligamentous avulsion fracture.     See above Electronically signed by: Go Latham Jr Date:    03/25/2025 Time:    09:40        Assessment:     1. Trigger finger of right thumb    2. Thumb pain, right        Plan:     Patient presents with stiffness w/ extension of right thumb since this weekend.  Denies an injury.  States she lifted a case of water however does not remember if pain started then.  Noticed the swelling and pain over the weekend without known event or cause.  Hx of carpal tunnel however not followed for this in several years.    Right hand dominant.    Trigger finger of right thumb  -     THUMB ORTHOSIS SPLINT UNIVERSAL FOR HOME USE  -     Ambulatory referral/consult to Hand Surgery    Thumb pain, right  -     X-Ray Finger 2 or More Views Right; Future; Expected date: 03/25/2025  -     Ambulatory referral/consult to Hand Surgery

## 2025-04-01 ENCOUNTER — OFFICE VISIT (OUTPATIENT)
Dept: ORTHOPEDICS | Facility: CLINIC | Age: 55
End: 2025-04-01
Payer: COMMERCIAL

## 2025-04-01 DIAGNOSIS — G56.03 BILATERAL CARPAL TUNNEL SYNDROME: Primary | ICD-10-CM

## 2025-04-01 DIAGNOSIS — M65.311 TRIGGER FINGER OF RIGHT THUMB: ICD-10-CM

## 2025-04-01 PROCEDURE — 99204 OFFICE O/P NEW MOD 45 MIN: CPT | Mod: 25,S$GLB,, | Performed by: SPECIALIST/TECHNOLOGIST

## 2025-04-01 PROCEDURE — 1160F RVW MEDS BY RX/DR IN RCRD: CPT | Mod: CPTII,S$GLB,, | Performed by: SPECIALIST/TECHNOLOGIST

## 2025-04-01 PROCEDURE — 1159F MED LIST DOCD IN RCRD: CPT | Mod: CPTII,S$GLB,, | Performed by: SPECIALIST/TECHNOLOGIST

## 2025-04-01 PROCEDURE — 76942 ECHO GUIDE FOR BIOPSY: CPT | Mod: S$GLB,,, | Performed by: SPECIALIST/TECHNOLOGIST

## 2025-04-01 PROCEDURE — 99999 PR PBB SHADOW E&M-EST. PATIENT-LVL III: CPT | Mod: PBBFAC,,, | Performed by: SPECIALIST/TECHNOLOGIST

## 2025-04-01 PROCEDURE — 20550 NJX 1 TENDON SHEATH/LIGAMENT: CPT | Mod: RT,S$GLB,, | Performed by: SPECIALIST/TECHNOLOGIST

## 2025-04-01 RX ORDER — DEXAMETHASONE SODIUM PHOSPHATE 4 MG/ML
4 INJECTION, SOLUTION INTRA-ARTICULAR; INTRALESIONAL; INTRAMUSCULAR; INTRAVENOUS; SOFT TISSUE
Status: DISCONTINUED | OUTPATIENT
Start: 2025-04-01 | End: 2025-04-01 | Stop reason: HOSPADM

## 2025-04-01 RX ADMIN — DEXAMETHASONE SODIUM PHOSPHATE 4 MG: 4 INJECTION, SOLUTION INTRA-ARTICULAR; INTRALESIONAL; INTRAMUSCULAR; INTRAVENOUS; SOFT TISSUE at 11:04

## 2025-04-01 NOTE — PROGRESS NOTES
"Patient ID: Claudette Loving is a 54 y.o. female.    Chief Complaint: Pain, Numbness, and Swelling of the Right Hand    History of Present Illness    CHIEF COMPLAINT:  - Right thumb pain and locking, with associated numbness and tingling in both hands.    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 She initially attributed it to lifting a case of water but now doubts this connection. Pain is located at the base of the thumb, with locking that she can "pop" back. She reports constant numbness and tingling in both hands, with the right hand more severely affected. Symptoms affect her ability to use her dominant hand, which is crucial for her work as a nurse.    She has a history of carpal tunnel syndrome diagnosed several years ago. For this, she underwent therapy including hot wax treatments. A practitioner on [facility] previously told her she was not a candidate for surgery due to her tunnels being too small. She denies any recent EMG studies.    To manage her current symptoms, she has been using braces, including one recently provided by Urgent Care, which she describes as excessively restrictive.    She denies any formal medical diagnoses beyond the previously diagnosed carpal tunnel syndrome.    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          Hand/Wrist Musculoskeletal Exam    Inspection    Right      Erythema: none      Ecchymosis: none      Edema: none      " Deformity: none    Left      Erythema: none      Ecchymosis: none      Edema: none      Deformity: none    Palpation    Left      Triggering: thumb      Thumb tenderness to palpation: A1 pulley    Range of Motion        Range of motion additional comments: Able to make a composite fist.    Neurovascular    Right       Radial pulse: normal      Capillary refill: brisk      Ulnar nerve sensory distribution: normal      Median nerve sensory distribution: normal      Superficial radial nerve sensory distribution: normal    Left       Radial pulse: normal      Capillary refill: brisk      Ulnar nerve sensory distribution: normal      Median nerve sensory distribution: normal      Superficial radial nerve sensory distribution: normal    Neurovascular additional comments:         Physical Exam    Musculoskeletal: Tenderness at thumb. Trigger thumb. Deformity at thumb. Nodule on thumb tendon.           IMAGING  Right Thumb XR  Personal interpretation of the XR reveals no signs of fractures or dislocations      PLAN  Assessment & Plan    54-year-old female presents to clinic today with right trigger thumb and bilateral carpal tunnel.  - Recommend EMG study to evaluate carpal tunnel and cubital tunnel symptoms.  - Right trigger thumb corticosteroid injection administered today under ultrasound guidance.  - Use thumb brace as much as possible at nighttime.  - Use carpal tunnel brace loosely at night to prevent wrist flexion.          PROCEDURE NOTE  R Thumb Tendon Sheath    Date/Time: 4/1/2025 11:00 AM    Performed by: Brandyn Flores PA-C  Authorized by: Brandyn Flores PA-C    Consent Done?:  Yes (Verbal)  Indications:  Pain  Timeout: prior to procedure the correct patient, procedure, and site was verified    Local anesthesia used?: Yes    Anesthesia:  Local infiltration  Local anesthetic:  Lidocaine 1% without epinephrine  Anesthetic total (ml):  0.5    Location:  Thumb  Site:  R thumb flexor tendon sheath  Ultrasonic guidance  for needle placement?: Yes (Ultrasound guidance was utilized for needle localization. Dynamic visualization of the needle was continuous throughout the procedure and maintained accurate placement. Images were saved and stored for documentation.)    Needle size:  25 G  Approach:  Volar  Medications:  4 mg dexAMETHasone 4 mg/mL  Patient tolerance:  Patient tolerated the procedure well with no immediate complications      Tim Flores PA-C, ATC  Hand and Upper Extremity   [facility]    This note was generated with the assistance of ambient listening technology. Verbal consent was obtained by the patient and accompanying visitor(s) for the recording of patient appointment to facilitate this note. I attest to having reviewed and edited the generated note for accuracy, though some syntax or spelling errors may persist. Please contact the author of this note for any clarification.

## 2025-04-10 ENCOUNTER — OFFICE VISIT (OUTPATIENT)
Dept: OBSTETRICS AND GYNECOLOGY | Facility: CLINIC | Age: 55
End: 2025-04-10
Payer: COMMERCIAL

## 2025-04-10 VITALS
DIASTOLIC BLOOD PRESSURE: 78 MMHG | SYSTOLIC BLOOD PRESSURE: 122 MMHG | BODY MASS INDEX: 39.07 KG/M2 | WEIGHT: 206.81 LBS

## 2025-04-10 DIAGNOSIS — Z78.0 POSTMENOPAUSAL: ICD-10-CM

## 2025-04-10 DIAGNOSIS — Z01.419 ENCOUNTER FOR GYNECOLOGICAL EXAMINATION WITHOUT ABNORMAL FINDING: Primary | ICD-10-CM

## 2025-04-10 DIAGNOSIS — E66.9 MODERATE OBESITY: ICD-10-CM

## 2025-04-10 PROCEDURE — 3078F DIAST BP <80 MM HG: CPT | Mod: CPTII,S$GLB,, | Performed by: OBSTETRICS & GYNECOLOGY

## 2025-04-10 PROCEDURE — 1159F MED LIST DOCD IN RCRD: CPT | Mod: CPTII,S$GLB,, | Performed by: OBSTETRICS & GYNECOLOGY

## 2025-04-10 PROCEDURE — 3074F SYST BP LT 130 MM HG: CPT | Mod: CPTII,S$GLB,, | Performed by: OBSTETRICS & GYNECOLOGY

## 2025-04-10 PROCEDURE — 99396 PREV VISIT EST AGE 40-64: CPT | Mod: S$GLB,,, | Performed by: OBSTETRICS & GYNECOLOGY

## 2025-04-10 PROCEDURE — 3008F BODY MASS INDEX DOCD: CPT | Mod: CPTII,S$GLB,, | Performed by: OBSTETRICS & GYNECOLOGY

## 2025-04-10 PROCEDURE — 1160F RVW MEDS BY RX/DR IN RCRD: CPT | Mod: CPTII,S$GLB,, | Performed by: OBSTETRICS & GYNECOLOGY

## 2025-04-10 PROCEDURE — 99999 PR PBB SHADOW E&M-EST. PATIENT-LVL III: CPT | Mod: PBBFAC,,, | Performed by: OBSTETRICS & GYNECOLOGY

## 2025-04-10 NOTE — PROGRESS NOTES
CC: Well woman exam    Claudette Loving is a 54 y.o. female  presents for a well woman exam.  LMP: No LMP recorded. Patient is premenopausal..  No issues, problems, or complaints.      Past Medical History:   Diagnosis Date    Usual hyperplasia of lactiferous duct 2020    fibrocystic changes including fibroadenomatoid change, usual ductal      Past Surgical History:   Procedure Laterality Date    BREAST BIOPSY Right 2020    fibrocystic changes including fibroadenomatoid change, usual ductal     COLONOSCOPY N/A 2023    Procedure: COLONOSCOPY;  Surgeon: Sushil Trivedi MD;  Location: [facility] ([address] [place] [address]);  Service: Endoscopy;  Laterality: N/A;  Colitis on ct scan 23. Needs colonoscopy early May  inst via portal   PreCall NA kls  pt confirmed earlier time  EB    TUBAL LIGATION       Social History     Socioeconomic History    Marital status: Single   Tobacco Use    Smoking status: Never    Smokeless tobacco: Never   Substance and Sexual Activity    Alcohol use: No    Drug use: No    Sexual activity: Yes     Partners: Male     Birth control/protection: Surgical     Social Drivers of Health     Financial Resource Strain: Low Risk  (2024)    Overall Financial Resource Strain (CARDIA)     Difficulty of Paying Living Expenses: Not hard at all   Food Insecurity: No Food Insecurity (2024)    Hunger Vital Sign     Worried About Running Out of Food in the Last Year: Never true     Ran Out of Food in the Last Year: Never true   Transportation Needs: No Transportation Needs (2024)    PRAPARE - Transportation     Lack of Transportation (Medical): No     Lack of Transportation (Non-Medical): No   Physical Activity: Insufficiently Active (2024)    Exercise Vital Sign     Days of Exercise per Week: 1 day     Minutes of Exercise per Session: 30 min   Stress: No Stress Concern Present (2024)    [employer] - Occupational Stress Questionnaire      Feeling of Stress : Not at all   Housing Stability: Unknown (2024)    Housing Stability Vital Sign     Unable to Pay for Housing in the Last Year: No     Family History   Problem Relation Name Age of Onset    Bone cancer Maternal Aunt      Bone cancer Maternal Aunt      Throat cancer Maternal Aunt      Prostate cancer Maternal Uncle      Uterine cancer Cousin paternal cousin     Ovarian cancer Cousin paternal cousin     Colon cancer Neg Hx      Breast cancer Neg Hx       OB History          0    Para   0    Term   0       0    AB   0    Living   0         SAB   0    IAB   0    Ectopic   0    Multiple   0    Live Births   0                 /78 (Patient Position: Sitting)   Wt 93.8 kg (206 lb 12.7 oz)   BMI 39.07 kg/m²       ROS:    ROS:  GENERAL: Denies weight gain or weight loss. Feeling well overall.   SKIN: Denies rash or lesions.   HEAD: Denies head injury or headache.   NODES: Denies enlarged lymph nodes.   CHEST: Denies chest pain or shortness of breath.   CARDIOVASCULAR: Denies palpitations or left sided chest pain.   ABDOMEN: No abdominal pain, constipation, diarrhea, nausea, vomiting or rectal bleeding.   URINARY: No frequency, dysuria, hematuria, or burning on urination.  REPRODUCTIVE: See HPI.   BREASTS: The patient performs breast self-examination and denies pain, lumps, or nipple discharge.   HEMATOLOGIC: No easy bruisability or excessive bleeding.   MUSCULOSKELETAL: Denies joint pain or swelling.   NEUROLOGIC: Denies syncope or weakness.   PSYCHIATRIC: Denies depression, anxiety or mood swings.    PHYSICAL EXAM:    APPEARANCE: Well nourished, well developed, in no acute distress.  AFFECT: WNL, alert and oriented x 3  SKIN: No acne or hirsutism  NECK: Neck symmetric without masses or thyromegaly  NODES: No inguinal, cervical, axillary, or femoral lymph node enlargement  CHEST: Good respiratory effect  ABDOMEN: Soft.  No tenderness or masses.  No hepatosplenomegaly.  No  hernias.  BREASTS: Symmetrical, no skin changes or visible lesions.  No palpable masses, nipple discharge bilaterally.  PELVIC: Normal external genitalia without lesions.  Normal hair distribution.  Adequate perineal body, normal urethral meatus.  Vagina moist and well rugated without lesions or discharge.  Cervix pink, without lesions, discharge or tenderness.  No significant cystocele or rectocele.  Bimanual exam shows uterus to be normal size, regular, mobile and nontender.  Adnexa without masses or tenderness.    RECTAL: Rectovaginal exam confirms above with normal sphincter tone, no masses.  EXTREMITIES: No edema.    Diagnosis      ICD-10-CM ICD-9-CM    1. Encounter for gynecological examination without abnormal finding  Z01.419 V72.31       2. Moderate obesity  E66.9 278.00       3. Postmenopausal  Z78.0 V49.81         Bonafide vitamins      Patient was counseled today on A.C.S. Pap guidelines and recommendations for yearly pelvic exams, mammograms and monthly self breast exams; to see her PCP for other health maintenance.     Follow up in about 1 year (around 4/10/2026), or if symptoms worsen or fail to improve.

## 2025-05-13 ENCOUNTER — PROCEDURE VISIT (OUTPATIENT)
Dept: NEUROLOGY | Facility: CLINIC | Age: 55
End: 2025-05-13
Payer: COMMERCIAL

## 2025-05-13 DIAGNOSIS — G56.03 BILATERAL CARPAL TUNNEL SYNDROME: ICD-10-CM

## 2025-05-13 PROCEDURE — 95886 MUSC TEST DONE W/N TEST COMP: CPT | Mod: S$GLB,,, | Performed by: PHYSICAL MEDICINE & REHABILITATION

## 2025-05-13 PROCEDURE — 95911 NRV CNDJ TEST 9-10 STUDIES: CPT | Mod: S$GLB,,, | Performed by: PHYSICAL MEDICINE & REHABILITATION

## 2025-05-13 PROCEDURE — 95885 MUSC TST DONE W/NERV TST LIM: CPT | Mod: 59,S$GLB,, | Performed by: PHYSICAL MEDICINE & REHABILITATION

## 2025-05-13 NOTE — PROCEDURES
Test Date:  2025    Patient: claudette diaz : 1970 Physician: Abdi Hernandez D.O.   ID#: [id number] SEX: Female Ref. Phys: Brandyn Flores PA-C     HPI: Claudette Diaz is a 54 y.o.female who presents for NCS/EMG to evaluate for CTS bilaterally.     PROCEDURE:  Prior to the procedure, the procedure was discussed in detail with the patient.  All questions were answered, and verbal consent was obtained.  For nerve conduction studies, a combination of surface electrodes, bar electrodes, and/or ring electrodes were used as needed.  For needle EMG, each site was cleaned and prepped in usual fashion with an alcohol pad.  A monopolar needle (28G) was used.  There was no significant bleeding, and bandages were applied as needed.  The procedure was tolerated without adverse effect.  The patient was instructed on post-procedure care including ice if needed for 10-15 minutes up to 4 times/day for any sore muscles.  I discussed with the patient that the data would be reviewed and a report sent to the referring provider, where any follow up questions regarding next steps should be directed.        NCV & EMG Findings:  Evaluation of the left median motor and the right median motor nerves showed prolonged distal onset latency.  The left median sensory nerve showed prolonged distal onset latency, prolonged distal peak latency, and decreased conduction velocity.  The right median sensory nerve showed prolonged distal onset latency, prolonged distal peak latency, reduced amplitude, and decreased conduction velocity.  All remaining nerves (as indicated in the following tables) were within normal limits.  Needle evaluation of the right Abductor Pollicis Brevis and the left Abductor Pollicis Brevis muscles showed increased motor unit amplitude and increased motor unit duration.  All remaining muscles (as indicated in the following table) showed no evidence of electrical instability.      IMPRESSIONS:  There is  electrophysiologic evidence of chronic bilateral sensorimotor median mononeuropathy across the wrist (I.e. Carpal tunnel syndrome).  There is no motor axonal loss.  There is no active denervation.  This is graded as Severe in severity on the right, and Moderate on the left.    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  This is not a pathologic finding, rather a normal variant.            ___________________________  Abdi Hernandez D.O.        NCS+  Motor Nerve Results      Latency Amplitude F-Lat Segment Distance CV Comment   Site (ms) Norm (mV) Norm (ms)  (cm) (m/s) Norm    Left Median (APB)   Wrist *5.8  < 4.4 7.3  > 4.2         Elbow 7.9 - 6.8 -  Elbow-Wrist 22 105  > 51    Right Median (APB)   Wrist *7.1  < 4.4 6.4  > 4.2         Elbow 11.1 - 6.3 -  Elbow-Wrist 22 55  > 51    Left Ulnar (ADM)   Wrist 2.5  < 3.7 10.5  > 3.0         Bel Elbow 6.4 - 8.8 -  Bel Elbow-Wrist 25 64  > 52    Abv Elbow 7.6 - 8.3 -  Abv Elbow-Bel Elbow 9 75  > 43    Right Ulnar (ADM)   Wrist 2.8  < 3.7 10.2  > 3.0         Bel Elbow 6.7 - 6.4 -  Bel Elbow-Wrist 24 62  > 52    Abv Elbow 8.1 - 7.1 -  Abv Elbow-Bel Elbow 8 57  > 43      Sensory Nerve Results      Start Lat Latency (Peak) Amplitude (P-P) Segment Distance Start CV Comment   Site (ms) Norm (ms) (µV) Norm  (cm) (m/s) Norm    Left Median (Rec:Dig II)   Wrist *3.8  < 3.3 *4.8 52  > 8 Wrist-Dig II 14 *37  > 42    Right Median (Rec:Dig II)   Wrist *6.0  < 3.3 *7.4 *5  > 8 Wrist-Dig II 14 *23  > 42    Left Ulnar (Rec:Dig V)   Wrist 2.1  < 3.1 2.9 50  > 4 Wrist-Dig V 14 67  > 45    Right Ulnar (Rec:Dig V)   Wrist 2.1  < 3.1 2.9 66  > 4 Wrist-Dig V 14 67  > 45    Right Radial (Rec:Wrist)   Forearm 1.03  < 2.2 1.50 60  > 11 Forearm-Wrist 10 97 -      EMG+     Side Muscle Nerve Root Ins Act Fibs Psw Amp Dur Poly Recrt Int Pat Comment   Right Deltoid Axillary C5-C6 Nml Nml Nml Nml Nml 0 Nml Nml    Right Biceps  Musculocut C5-C6 Nml Nml Nml Nml Nml 0 Nml Nml    Right Triceps Radial C6-C8 Nml Nml Nml Nml Nml 0 Nml Nml    Right Pronator Teres Median C6-C7 Nml Nml Nml Nml Nml 0 Nml Nml    Right APB Median C8-T1 Nml Nml Nml *Incr *>12ms 0 Nml Nml    Left APB Median C8-T1 Nml Nml Nml *Incr *>12ms 0 Nml Nml            Waveforms:    Motor              Sensory

## 2025-05-20 ENCOUNTER — OFFICE VISIT (OUTPATIENT)
Dept: ORTHOPEDICS | Facility: CLINIC | Age: 55
End: 2025-05-20
Payer: COMMERCIAL

## 2025-05-20 VITALS — WEIGHT: 206.81 LBS | HEIGHT: 61 IN | BODY MASS INDEX: 39.05 KG/M2

## 2025-05-20 DIAGNOSIS — G56.03 BILATERAL CARPAL TUNNEL SYNDROME: Primary | ICD-10-CM

## 2025-05-20 PROCEDURE — 99999 PR PBB SHADOW E&M-EST. PATIENT-LVL III: CPT | Mod: PBBFAC,,, | Performed by: ORTHOPAEDIC SURGERY

## 2025-05-20 PROCEDURE — 3008F BODY MASS INDEX DOCD: CPT | Mod: CPTII,S$GLB,, | Performed by: ORTHOPAEDIC SURGERY

## 2025-05-20 PROCEDURE — 1159F MED LIST DOCD IN RCRD: CPT | Mod: CPTII,S$GLB,, | Performed by: ORTHOPAEDIC SURGERY

## 2025-05-20 PROCEDURE — 99204 OFFICE O/P NEW MOD 45 MIN: CPT | Mod: S$GLB,,, | Performed by: ORTHOPAEDIC SURGERY

## 2025-05-20 NOTE — PROGRESS NOTES
"Patient ID: Claudette Loving is a 54 y.o. female.    Chief Complaint: Pain, Numbness, and Swelling of the Right Hand    Interval Update 05/20/2025    Pt presents in clinic today to discuss the results of her EMG 05/13/25. Pts EMG shows bilateral CTS severity is severe on the right and moderate on the left. Pt states that she is not interested in surgery at this time due to her work ethic and financial constraints, pt does not want to miss overtime shifts. Pt states that her pain is R > L and that she also has a right thumb that actively triggers. Pt reports that the prior injection 04/01/25 for her trigger thumb provided relief. Pt states that she is wearing the braces at night.     History of Present Illness    CHIEF COMPLAINT:  - Right thumb pain and locking, with associated numbness and tingling in both hands.    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 She initially attributed it to lifting a case of water but now doubts this connection. Pain is located at the base of the thumb, with locking that she can "pop" back. She reports constant numbness and tingling in both hands, with the right hand more severely affected. Symptoms affect her ability to use her dominant hand, which is crucial for her work as a nurse.    She has a history of carpal tunnel syndrome diagnosed several years ago. For this, she underwent therapy including hot wax treatments. A practitioner on [facility] previously told her she was not a candidate for surgery due to her tunnels being too small. She denies any recent EMG studies.    To manage her current symptoms, she has been using braces, including one recently provided by Urgent Care, which she describes as excessively restrictive.    She denies any formal medical diagnoses beyond the previously diagnosed carpal tunnel syndrome.    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          Hand/Wrist Musculoskeletal Exam    Inspection    Right      Erythema: none      Ecchymosis: none      Edema: none      Deformity: none    Left      Erythema: none      Ecchymosis: none      Edema: none      Deformity: none    Palpation    Left      Triggering: thumb      Thumb tenderness to palpation: A1 pulley    Range of Motion        Range of motion additional comments: Able to make a composite fist.    Neurovascular    Right       Radial pulse: normal      Capillary refill: brisk      Ulnar nerve sensory distribution: normal      Median nerve sensory distribution: normal      Superficial radial nerve sensory distribution: normal    Left       Radial pulse: normal      Capillary refill: brisk      Ulnar nerve sensory distribution: normal      Median nerve sensory distribution: normal      Superficial radial nerve sensory distribution: normal    Neurovascular additional comments:         Physical Exam    Musculoskeletal: Tenderness at thumb. Trigger thumb. Deformity at thumb. Nodule on thumb tendon.           IMAGING  Right Thumb XR  Personal interpretation of the XR reveals no signs of fractures or dislocations      PLAN  Assessment & Plan    54-year-old female presents to clinic today with right trigger thumb and bilateral carpal tunnel.  - Explained the risks, benefits, and alternatives of the CTR procedure to the patient in great detail.  - All questions answered.  - Patient will call us back for an appointment to sign consents with PA      This note has been  scribed in part by Hussain Angel, ATC/L, OTC, my Sports Medicine Assistant (SMA). This SMA performed & documented a complete history pre-assessment including the history of present illness, which I, Sierra Riley MD, explored & confirmed personally with the patient. The [facility] has scribed portions of this note including my physical exanimation, diagnostic imaging interpretation, procedures performed, my plan of care & diagnosis. I agree that the scribed documentation is accurate & complete.

## 2025-06-12 NOTE — PROGRESS NOTES
Patient ID: Claudette Loving is a 54 y.o. female.    Chief Complaint: Follow-up, Pain, and Numbness of the Right Hand and Follow-up, Pain, and Numbness of the Left Hand    History of Present Illness    CHIEF COMPLAINT:  Bilateral carpal tunnel syndrome and right trigger thumb    HPI:  Ms. Loving presents for follow-up of EMG results showing severe right carpal tunnel syndrome and moderate left carpal tunnel syndrome. She reports bilateral hand symptoms, worse on the right, with numbness and tingling in the thumb, index finger, middle finger, and ring finger, which is intermittent. She confirms the distribution of symptoms. Pain is worse on the right compared to the left.    She mentions having a right trigger thumb that started about two months ago, which resolved after 3-4 weeks. She was diagnosed with carpal tunnel syndrome several years ago and underwent therapy at that time. A non-hand surgeon informed her that her tunnels were too small to benefit from carpal tunnel surgery. She denies any known injury to her hand but notes that symptoms have worsened over time due to compression.    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 She denies having rheumatoid arthritis.    PREVIOUS TREATMENTS:  Ms. Loving received therapy for carpal tunnel syndrome several years ago. She also experienced a trigger thumb on her right hand, which resolved after 3-4 weeks without intervention.    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 She recently performed CPR compressions during two patient deaths. Ms. Loving is concerned about missing work for potential carpal tunnel surgery.    IMAGING:  EMG results show severe carpal tunnel on the right and moderate on the left.      ROS:  General: denies fever, denies  chills, denies fatigue, denies weight gain, denies weight loss  Eyes: denies vision changes, denies redness, denies discharge  ENT: denies ear pain, denies nasal congestion, denies sore throat  Cardiovascular: denies chest pain, denies palpitations, denies lower extremity edema  Respiratory: denies cough, denies shortness of breath  Gastrointestinal: denies abdominal pain, denies nausea, denies vomiting, denies diarrhea, denies constipation, denies blood in stool  Genitourinary: denies dysuria, denies hematuria, denies frequency  Musculoskeletal: denies joint pain, denies muscle pain  Skin: denies rash, denies lesion  Neurological: denies headache, denies dizziness, reports numbness, reports tingling  Psychiatric: denies anxiety, denies depression, denies sleep difficulty         Physical Exam              Assessment & Plan     Discussed carpal tunnel release with the patient.   Goal: Release the transverse ligament (100% success rate).   Nerve regeneration unpredictable, can take 12-18 months.   Prolonging surgery risks permanent nerve damage, constant numbness, and loss of thumb strength.   Ms. Loving expressed concerns about surgery benefits.   Offered to order nerve test for patient consideration.   Avoid prolonged compression on the nerve.   Follow up after patient decides about surgery.            No follow-ups on file.    This note was generated with the assistance of ambient listening technology. Verbal consent was obtained by the patient and accompanying visitor(s) for the recording of patient appointment to facilitate this note. I attest to having reviewed and edited the generated note for accuracy, though some syntax or spelling errors may persist. Please contact the author of this note for any clarification.